# Patient Record
Sex: FEMALE | Race: WHITE | Employment: FULL TIME | URBAN - METROPOLITAN AREA
[De-identification: names, ages, dates, MRNs, and addresses within clinical notes are randomized per-mention and may not be internally consistent; named-entity substitution may affect disease eponyms.]

---

## 2017-06-12 PROBLEM — E55.9 VITAMIN D DEFICIENCY: Status: ACTIVE | Noted: 2017-06-12

## 2017-06-12 PROBLEM — E66.9 OBESITY (BMI 30-39.9): Status: ACTIVE | Noted: 2017-06-12

## 2017-06-12 PROBLEM — E78.5 HYPERLIPIDEMIA LDL GOAL <130: Status: ACTIVE | Noted: 2017-06-12

## 2017-06-26 ENCOUNTER — HOSPITAL ENCOUNTER (OUTPATIENT)
Dept: SLEEP MEDICINE | Age: 52
Discharge: HOME | End: 2017-06-26
Attending: FAMILY MEDICINE
Payer: COMMERCIAL

## 2017-06-26 PROCEDURE — 95811 POLYSOM 6/>YRS CPAP 4/> PARM: CPT

## 2017-07-11 PROBLEM — Z72.821 INADEQUATE SLEEP HYGIENE: Status: ACTIVE | Noted: 2017-07-11

## 2017-07-11 PROBLEM — G47.33 OSA (OBSTRUCTIVE SLEEP APNEA): Status: ACTIVE | Noted: 2017-07-11

## 2019-05-29 ENCOUNTER — HOSPITAL ENCOUNTER (OUTPATIENT)
Dept: ULTRASOUND IMAGING | Age: 54
Discharge: HOME | End: 2019-05-29
Attending: NURSE PRACTITIONER

## 2019-05-29 DIAGNOSIS — M79.89 SWELLING OF LEFT LOWER EXTREMITY: ICD-10-CM

## 2019-06-04 PROBLEM — R01.1 HEART MURMUR: Status: ACTIVE | Noted: 2019-06-04

## 2019-06-25 ENCOUNTER — HOSPITAL ENCOUNTER (OUTPATIENT)
Dept: NON INVASIVE DIAGNOSTICS | Age: 54
Discharge: HOME | End: 2019-06-25
Attending: NURSE PRACTITIONER

## 2019-06-25 DIAGNOSIS — R01.1 SYSTOLIC MURMUR: ICD-10-CM

## 2020-05-19 ENCOUNTER — HOSPITAL ENCOUNTER (OUTPATIENT)
Dept: GENERAL RADIOLOGY | Age: 55
Discharge: HOME | End: 2020-05-19

## 2020-05-19 DIAGNOSIS — M25.562 CHRONIC PAIN OF LEFT KNEE: ICD-10-CM

## 2020-05-19 DIAGNOSIS — G89.29 CHRONIC PAIN OF LEFT KNEE: ICD-10-CM

## 2020-05-19 PROBLEM — K57.30 DIVERTICULOSIS OF COLON: Status: ACTIVE | Noted: 2019-08-07

## 2020-05-19 PROBLEM — K44.9 DIAPHRAGMATIC HERNIA: Status: ACTIVE | Noted: 2020-05-19

## 2020-05-19 PROBLEM — K64.4 EXTERNAL HEMORRHOIDS: Status: ACTIVE | Noted: 2020-05-19

## 2020-05-19 PROBLEM — D12.6 BENIGN NEOPLASM OF COLON: Status: ACTIVE | Noted: 2019-08-07

## 2021-10-22 ENCOUNTER — HOSPITAL ENCOUNTER (OUTPATIENT)
Dept: LAB | Age: 56
Discharge: HOME | End: 2021-10-22

## 2021-10-22 PROCEDURE — 88305 TISSUE EXAM BY PATHOLOGIST: CPT

## 2022-03-18 PROBLEM — E55.9 VITAMIN D DEFICIENCY: Status: ACTIVE | Noted: 2017-06-12

## 2022-03-18 PROBLEM — K64.4 EXTERNAL HEMORRHOIDS: Status: ACTIVE | Noted: 2020-05-19

## 2022-03-18 PROBLEM — K44.9 DIAPHRAGMATIC HERNIA: Status: ACTIVE | Noted: 2020-05-19

## 2022-03-19 PROBLEM — G47.33 OSA (OBSTRUCTIVE SLEEP APNEA): Status: ACTIVE | Noted: 2017-07-11

## 2022-03-19 PROBLEM — K57.30 DIVERTICULOSIS OF COLON: Status: ACTIVE | Noted: 2019-08-07

## 2022-03-19 PROBLEM — Z72.821 INADEQUATE SLEEP HYGIENE: Status: ACTIVE | Noted: 2017-07-11

## 2022-03-19 PROBLEM — D12.6 BENIGN NEOPLASM OF COLON: Status: ACTIVE | Noted: 2019-08-07

## 2022-03-19 PROBLEM — E66.9 OBESITY (BMI 30-39.9): Status: ACTIVE | Noted: 2017-06-12

## 2022-03-19 PROBLEM — R01.1 HEART MURMUR: Status: ACTIVE | Noted: 2019-06-04

## 2022-03-20 PROBLEM — E78.5 HYPERLIPIDEMIA LDL GOAL <130: Status: ACTIVE | Noted: 2017-06-12

## 2022-11-15 ENCOUNTER — OFFICE VISIT (OUTPATIENT)
Dept: FAMILY MEDICINE CLINIC | Facility: CLINIC | Age: 57
End: 2022-11-15
Payer: COMMERCIAL

## 2022-11-15 VITALS
WEIGHT: 215 LBS | SYSTOLIC BLOOD PRESSURE: 125 MMHG | HEART RATE: 85 BPM | DIASTOLIC BLOOD PRESSURE: 75 MMHG | BODY MASS INDEX: 36.7 KG/M2 | TEMPERATURE: 98.5 F | HEIGHT: 64 IN

## 2022-11-15 DIAGNOSIS — I10 ESSENTIAL (PRIMARY) HYPERTENSION: ICD-10-CM

## 2022-11-15 DIAGNOSIS — U07.1 COVID-19: ICD-10-CM

## 2022-11-15 DIAGNOSIS — M70.52 OTHER BURSITIS OF KNEE, LEFT KNEE: ICD-10-CM

## 2022-11-15 DIAGNOSIS — R73.03 PREDIABETES: ICD-10-CM

## 2022-11-15 DIAGNOSIS — E78.49 OTHER HYPERLIPIDEMIA: Primary | ICD-10-CM

## 2022-11-15 DIAGNOSIS — K21.00 GASTRO-ESOPHAGEAL REFLUX DISEASE WITH ESOPHAGITIS, WITHOUT BLEEDING: ICD-10-CM

## 2022-11-15 PROCEDURE — 3074F SYST BP LT 130 MM HG: CPT | Performed by: NURSE PRACTITIONER

## 2022-11-15 PROCEDURE — 3078F DIAST BP <80 MM HG: CPT | Performed by: NURSE PRACTITIONER

## 2022-11-15 PROCEDURE — 99214 OFFICE O/P EST MOD 30 MIN: CPT | Performed by: NURSE PRACTITIONER

## 2022-11-15 RX ORDER — AMLODIPINE BESYLATE 5 MG/1
5 TABLET ORAL DAILY
Qty: 90 TABLET | Refills: 1 | Status: SHIPPED | OUTPATIENT
Start: 2022-11-15

## 2022-11-15 RX ORDER — SPIRONOLACTONE 25 MG/1
25 TABLET ORAL DAILY
Qty: 90 TABLET | Refills: 1 | Status: SHIPPED | OUTPATIENT
Start: 2022-11-15

## 2022-11-15 RX ORDER — OMEPRAZOLE 40 MG/1
40 CAPSULE, DELAYED RELEASE ORAL DAILY
Qty: 90 CAPSULE | Refills: 1 | Status: SHIPPED | OUTPATIENT
Start: 2022-11-15

## 2022-11-15 RX ORDER — LOSARTAN POTASSIUM 50 MG/1
50 TABLET ORAL DAILY
Qty: 90 TABLET | Refills: 1 | Status: SHIPPED | OUTPATIENT
Start: 2022-11-15

## 2022-11-15 RX ORDER — PRAVASTATIN SODIUM 40 MG
40 TABLET ORAL NIGHTLY
Qty: 90 TABLET | Refills: 1 | Status: SHIPPED | OUTPATIENT
Start: 2022-11-15

## 2022-11-15 SDOH — ECONOMIC STABILITY: FOOD INSECURITY: WITHIN THE PAST 12 MONTHS, YOU WORRIED THAT YOUR FOOD WOULD RUN OUT BEFORE YOU GOT MONEY TO BUY MORE.: NEVER TRUE

## 2022-11-15 SDOH — ECONOMIC STABILITY: FOOD INSECURITY: WITHIN THE PAST 12 MONTHS, THE FOOD YOU BOUGHT JUST DIDN'T LAST AND YOU DIDN'T HAVE MONEY TO GET MORE.: NEVER TRUE

## 2022-11-15 ASSESSMENT — PATIENT HEALTH QUESTIONNAIRE - PHQ9
SUM OF ALL RESPONSES TO PHQ QUESTIONS 1-9: 0
SUM OF ALL RESPONSES TO PHQ QUESTIONS 1-9: 0
SUM OF ALL RESPONSES TO PHQ9 QUESTIONS 1 & 2: 0
SUM OF ALL RESPONSES TO PHQ QUESTIONS 1-9: 0
SUM OF ALL RESPONSES TO PHQ QUESTIONS 1-9: 0
1. LITTLE INTEREST OR PLEASURE IN DOING THINGS: 0
2. FEELING DOWN, DEPRESSED OR HOPELESS: 0

## 2022-11-15 ASSESSMENT — ENCOUNTER SYMPTOMS
SHORTNESS OF BREATH: 0
COUGH: 0

## 2022-11-15 ASSESSMENT — SOCIAL DETERMINANTS OF HEALTH (SDOH): HOW HARD IS IT FOR YOU TO PAY FOR THE VERY BASICS LIKE FOOD, HOUSING, MEDICAL CARE, AND HEATING?: NOT HARD AT ALL

## 2022-11-15 NOTE — PROGRESS NOTES
Subjective:      Patient ID: Oli Syed is a 61 y.o. female. Here for refills of med for   Cholesterol taking med daily  Hypertension taking med daily no chest pain no shortness of breath  Reflux controlled with current med   Knee pain improved with topical med has had some right hip pain since covid thinks is due to her inactivity during her illness    HPI    Review of Systems   Constitutional:  Negative for diaphoresis and fatigue. HENT:  Negative for congestion. Respiratory:  Negative for cough and shortness of breath. Cardiovascular:  Negative for chest pain. Musculoskeletal:  Negative for arthralgias. Hematological:  Negative for adenopathy. Objective:   Physical Exam  Vitals and nursing note reviewed. Constitutional:       Appearance: Normal appearance. She is normal weight. Cardiovascular:      Rate and Rhythm: Normal rate and regular rhythm. Pulses: Normal pulses. Heart sounds: Normal heart sounds. Pulmonary:      Effort: Pulmonary effort is normal.      Breath sounds: Normal breath sounds. Musculoskeletal:      Comments: Ome decreased internal and external rotation of right hip   Skin:     General: Skin is warm and dry. Capillary Refill: Capillary refill takes less than 2 seconds. Neurological:      Mental Status: She is alert. Psychiatric:         Mood and Affect: Mood normal.         Behavior: Behavior normal.         Thought Content: Thought content normal.         Judgment: Judgment normal.       Assessment:      /75 (Site: Left Upper Arm, Position: Sitting, Cuff Size: Large Adult)   Pulse 85   Temp 98.5 °F (36.9 °C) (Temporal)   Ht 5' 4.25\" (1.632 m)   Wt 215 lb (97.5 kg)   BMI 36.62 kg/m²         Plan:      1. Other hyperlipidemia  -     pravastatin (PRAVACHOL) 40 MG tablet; Take 1 tablet by mouth at bedtime, Disp-90 tablet, R-1Normal  -     Comprehensive Metabolic Panel; Future  -     Lipid Panel; Future  2.  Essential (primary) hypertension  -     losartan (COZAAR) 50 MG tablet; Take 1 tablet by mouth daily, Disp-90 tablet, R-1Normal  -     spironolactone (ALDACTONE) 25 MG tablet; Take 1 tablet by mouth daily, Disp-90 tablet, R-1Normal  -     amLODIPine (NORVASC) 5 MG tablet; Take 1 tablet by mouth daily, Disp-90 tablet, R-1Normal  3. Other bursitis of knee, left knee  -     diclofenac sodium (VOLTAREN) 1 % GEL; Apply topically 4 times daily, Topical, 4 TIMES DAILY Starting Tue 11/15/2022, Disp-100 g, R-3, Normal  4. Gastro-esophageal reflux disease with esophagitis, without bleeding  -     omeprazole (PRILOSEC) 40 MG delayed release capsule; Take 1 capsule by mouth daily, Disp-90 capsule, R-1Normal  5. COVID-19  6. Prediabetes  -     Hemoglobin A1C; Future       Refilled meds checking labs Urged smoking cessation. Is to return if hip does not respond to stretching.  May need xray may be Suman 25, APRN - NP

## 2022-11-18 DIAGNOSIS — E78.49 OTHER HYPERLIPIDEMIA: ICD-10-CM

## 2022-11-18 DIAGNOSIS — R73.03 PREDIABETES: ICD-10-CM

## 2022-11-18 LAB
ALBUMIN SERPL-MCNC: 4.1 G/DL (ref 3.5–5)
ALBUMIN/GLOB SERPL: 1.4 {RATIO} (ref 0.4–1.6)
ALP SERPL-CCNC: 76 U/L (ref 50–136)
ALT SERPL-CCNC: 42 U/L (ref 12–65)
ANION GAP SERPL CALC-SCNC: 3 MMOL/L (ref 2–11)
AST SERPL-CCNC: 20 U/L (ref 15–37)
BILIRUB SERPL-MCNC: 1 MG/DL (ref 0.2–1.1)
BUN SERPL-MCNC: 14 MG/DL (ref 6–23)
CALCIUM SERPL-MCNC: 9.5 MG/DL (ref 8.3–10.4)
CHLORIDE SERPL-SCNC: 107 MMOL/L (ref 101–110)
CHOLEST SERPL-MCNC: 184 MG/DL
CO2 SERPL-SCNC: 28 MMOL/L (ref 21–32)
CREAT SERPL-MCNC: 0.8 MG/DL (ref 0.6–1)
EST. AVERAGE GLUCOSE BLD GHB EST-MCNC: 126 MG/DL
GLOBULIN SER CALC-MCNC: 3 G/DL (ref 2.8–4.5)
GLUCOSE SERPL-MCNC: 124 MG/DL (ref 65–100)
HBA1C MFR BLD: 6 % (ref 4.8–5.6)
HDLC SERPL-MCNC: 52 MG/DL (ref 40–60)
HDLC SERPL: 3.5 {RATIO}
LDLC SERPL CALC-MCNC: 98.4 MG/DL
POTASSIUM SERPL-SCNC: 4 MMOL/L (ref 3.5–5.1)
PROT SERPL-MCNC: 7.1 G/DL (ref 6.3–8.2)
SODIUM SERPL-SCNC: 138 MMOL/L (ref 133–143)
TRIGL SERPL-MCNC: 168 MG/DL (ref 35–150)
VLDLC SERPL CALC-MCNC: 33.6 MG/DL (ref 6–23)

## 2022-11-23 ENCOUNTER — TELEPHONE (OUTPATIENT)
Dept: FAMILY MEDICINE CLINIC | Facility: CLINIC | Age: 57
End: 2022-11-23

## 2022-11-23 NOTE — TELEPHONE ENCOUNTER
----- [facility] from [facility] sent at 11/21/2022  7:46 AM EST -----  Labs are all good HgA1c stable at 6.0

## 2023-02-14 ENCOUNTER — OFFICE VISIT (OUTPATIENT)
Dept: FAMILY MEDICINE CLINIC | Facility: CLINIC | Age: 58
End: 2023-02-14
Payer: COMMERCIAL

## 2023-02-14 VITALS
SYSTOLIC BLOOD PRESSURE: 135 MMHG | DIASTOLIC BLOOD PRESSURE: 82 MMHG | TEMPERATURE: 99 F | BODY MASS INDEX: 37.9 KG/M2 | WEIGHT: 222 LBS | HEART RATE: 102 BPM | HEIGHT: 64 IN

## 2023-02-14 DIAGNOSIS — M67.441 GANGLION CYST OF FLEXOR TENDON SHEATH OF FINGER OF RIGHT HAND: ICD-10-CM

## 2023-02-14 PROCEDURE — 3075F SYST BP GE 130 - 139MM HG: CPT | Performed by: FAMILY MEDICINE

## 2023-02-14 PROCEDURE — 3079F DIAST BP 80-89 MM HG: CPT | Performed by: FAMILY MEDICINE

## 2023-02-14 PROCEDURE — 99213 OFFICE O/P EST LOW 20 MIN: CPT | Performed by: FAMILY MEDICINE

## 2023-02-14 SDOH — ECONOMIC STABILITY: FOOD INSECURITY: WITHIN THE PAST 12 MONTHS, THE FOOD YOU BOUGHT JUST DIDN'T LAST AND YOU DIDN'T HAVE MONEY TO GET MORE.: NEVER TRUE

## 2023-02-14 SDOH — ECONOMIC STABILITY: FOOD INSECURITY: WITHIN THE PAST 12 MONTHS, YOU WORRIED THAT YOUR FOOD WOULD RUN OUT BEFORE YOU GOT MONEY TO BUY MORE.: NEVER TRUE

## 2023-02-14 SDOH — ECONOMIC STABILITY: INCOME INSECURITY: HOW HARD IS IT FOR YOU TO PAY FOR THE VERY BASICS LIKE FOOD, HOUSING, MEDICAL CARE, AND HEATING?: NOT HARD AT ALL

## 2023-02-14 SDOH — ECONOMIC STABILITY: HOUSING INSECURITY
IN THE LAST 12 MONTHS, WAS THERE A TIME WHEN YOU DID NOT HAVE A STEADY PLACE TO SLEEP OR SLEPT IN A SHELTER (INCLUDING NOW)?: NO

## 2023-02-14 NOTE — ASSESSMENT & PLAN NOTE
Problem and/or Symptoms are new to provider. Will have patient follow up as directed and make the following plan for further evaluation and/or treatment:    Appears to be a simple cyst that is not currently impairing her ROM or use of the 5th digit; has actually improved from initial presentation 2 wks ago. Advised pt to begin applying topical NSAID gel 3-4 X a day to the affected area as this may help it to resolve on its own; if it gets worse over the next few weeks will consider referral to hand specialist for definitive Tx via excision.

## 2023-02-14 NOTE — PROGRESS NOTES
Ina  [address] [place] [address]  Phone: [phone]  Fax: [phone]  Email: [email or web address]      Encounter [address] Gross; Established patient 60 y. o.female; seen 2/14/2023 for: Cyst (Right pinky-effecting motor skills)      Assessment & Plan    1. Ganglion cyst of flexor tendon sheath of finger of right hand  Assessment & Plan:  Problem and/or Symptoms are new to provider. Will have patient follow up as directed and make the following plan for further evaluation and/or treatment:    Appears to be a simple cyst that is not currently impairing her ROM or use of the 5th digit; has actually improved from initial presentation 2 wks ago. Advised pt to begin applying topical NSAID gel 3-4 X a day to the affected area as this may help it to resolve on its own; if it gets worse over the next few weeks will consider referral to hand specialist for definitive Tx via excision. Check Out Instructions  Return if symptoms worsen or fail to improve. Subjective & Objective    HPI  Pt states that 2 weeks ago she developed a SubQ cyst on the base of her R 5th digit near the MTP joint. Was initially quite swollen & red and was TTP; it has improved over the past week where the swelling & tenderness has gone down but the cyst still seems to be present. Has not tried anything OTC for this issue. Review of Systems    Physical Exam  Skin:     Comments: Small approx 0.5 cm SubQ cyst/nodule at the base of her 5th digit on the R hand; palmar side. Non-TTP & no surrounding erythema or current swelling     Vitals:    02/14/23 1407   BP: 135/82   Site: Left Upper Arm   Position: Sitting   Cuff Size: Large Adult   Pulse: (!) 102   Temp: 99 °F (37.2 °C)   Weight: 222 lb (100.7 kg)   Height: 5' 4\" (1.626 m)     BP Readings from Last 3 Encounters:   02/14/23 135/82   11/15/22 125/75   05/18/22 132/82     Body mass index is 38.11 kg/m².     Wt Readings from Last 3 Encounters: 02/14/23 222 lb (100.7 kg)   11/15/22 215 lb (97.5 kg)   05/18/22 209 lb (94.8 kg)       PHQ-9  11/15/2022   Little interest or pleasure in doing things 0   Little interest or pleasure in doing things -   Feeling down, depressed, or hopeless 0   PHQ-2 Score 0   Total Score PHQ 2 -   PHQ-9 Total Score 0        We discussed the typical prognosis and potential complications of the concern(s), including treatment options. Medication risks, benefits, costs, interactions, and alternatives were discussed as appropriate. I advised her to contact the office if her condition worsens or fails to improve as anticipated. She expressed understanding with the discussion and plan of care. An electronic signature was used to authenticate this note.   -- Diana Cowan MD

## 2023-02-14 NOTE — PATIENT INSTRUCTIONS
Please know that we keep Urgent Care visit slots in reserve every day for any acute illness or injury. If you ever have an urgent issue please call our office and we should typically be able to see you within 24 hours, but most often you will be able to be seen the same day that you call. We also offer Virtual Visits that can be done over a video connection, or regular phone call if you don't have a video connection, if that is your preference vs an office visit. Finally, please make sure you are scheduling your visits with someone who works at our [facility], and not scheduling with our \"call center\". When you get the phone tree options, press \"Option 2\" first & then \"Option 1\". This combination should take you directly to someone at our office. Please try to avoid scheduling any visits through our call center if at all possible.

## 2023-05-15 ENCOUNTER — OFFICE VISIT (OUTPATIENT)
Dept: FAMILY MEDICINE CLINIC | Facility: CLINIC | Age: 58
End: 2023-05-15
Payer: COMMERCIAL

## 2023-05-15 VITALS
DIASTOLIC BLOOD PRESSURE: 72 MMHG | TEMPERATURE: 98.2 F | BODY MASS INDEX: 37.73 KG/M2 | WEIGHT: 221 LBS | HEIGHT: 64 IN | SYSTOLIC BLOOD PRESSURE: 125 MMHG | HEART RATE: 92 BPM

## 2023-05-15 DIAGNOSIS — M70.52 OTHER BURSITIS OF KNEE, LEFT KNEE: ICD-10-CM

## 2023-05-15 DIAGNOSIS — E78.49 OTHER HYPERLIPIDEMIA: ICD-10-CM

## 2023-05-15 DIAGNOSIS — E66.01 SEVERE OBESITY (BMI 35.0-39.9) WITH COMORBIDITY (HCC): ICD-10-CM

## 2023-05-15 DIAGNOSIS — R53.82 CHRONIC FATIGUE: ICD-10-CM

## 2023-05-15 DIAGNOSIS — G47.30 SLEEP APNEA, UNSPECIFIED TYPE: ICD-10-CM

## 2023-05-15 DIAGNOSIS — K21.00 GASTRO-ESOPHAGEAL REFLUX DISEASE WITH ESOPHAGITIS, WITHOUT BLEEDING: ICD-10-CM

## 2023-05-15 DIAGNOSIS — I10 ESSENTIAL (PRIMARY) HYPERTENSION: ICD-10-CM

## 2023-05-15 DIAGNOSIS — I10 ESSENTIAL (PRIMARY) HYPERTENSION: Primary | ICD-10-CM

## 2023-05-15 PROCEDURE — 99214 OFFICE O/P EST MOD 30 MIN: CPT | Performed by: NURSE PRACTITIONER

## 2023-05-15 PROCEDURE — 3074F SYST BP LT 130 MM HG: CPT | Performed by: NURSE PRACTITIONER

## 2023-05-15 PROCEDURE — 3078F DIAST BP <80 MM HG: CPT | Performed by: NURSE PRACTITIONER

## 2023-05-15 RX ORDER — OMEPRAZOLE 40 MG/1
40 CAPSULE, DELAYED RELEASE ORAL DAILY
Qty: 90 CAPSULE | Refills: 1 | Status: SHIPPED | OUTPATIENT
Start: 2023-05-15

## 2023-05-15 RX ORDER — LOSARTAN POTASSIUM 50 MG/1
50 TABLET ORAL DAILY
Qty: 90 TABLET | Refills: 1 | Status: SHIPPED | OUTPATIENT
Start: 2023-05-15

## 2023-05-15 RX ORDER — SPIRONOLACTONE 25 MG/1
25 TABLET ORAL DAILY
Qty: 90 TABLET | Refills: 1 | Status: SHIPPED | OUTPATIENT
Start: 2023-05-15

## 2023-05-15 RX ORDER — PRAVASTATIN SODIUM 40 MG
40 TABLET ORAL NIGHTLY
Qty: 90 TABLET | Refills: 1 | Status: SHIPPED | OUTPATIENT
Start: 2023-05-15

## 2023-05-15 RX ORDER — AMLODIPINE BESYLATE 5 MG/1
5 TABLET ORAL DAILY
Qty: 90 TABLET | Refills: 1 | Status: SHIPPED | OUTPATIENT
Start: 2023-05-15

## 2023-05-15 ASSESSMENT — ENCOUNTER SYMPTOMS: SHORTNESS OF BREATH: 0

## 2023-05-15 ASSESSMENT — PATIENT HEALTH QUESTIONNAIRE - PHQ9
SUM OF ALL RESPONSES TO PHQ QUESTIONS 1-9: 0
2. FEELING DOWN, DEPRESSED OR HOPELESS: 0
SUM OF ALL RESPONSES TO PHQ QUESTIONS 1-9: 0
1. LITTLE INTEREST OR PLEASURE IN DOING THINGS: 0
SUM OF ALL RESPONSES TO PHQ QUESTIONS 1-9: 0
SUM OF ALL RESPONSES TO PHQ QUESTIONS 1-9: 0
SUM OF ALL RESPONSES TO PHQ9 QUESTIONS 1 & 2: 0

## 2023-05-15 NOTE — PROGRESS NOTES
Subjective:      Patient ID: Ronny Washington is a 60 y.o. female. She is here for refills of med's for   HTN No chest no shortness of breath activity tolerance is ok Is tired found out sister on thyroid med  Cholesterol taking med daily  Knee pain controlled with current med  Is looking forward to a trip to the [facility] in July. Obesity trying to lose weight snacking now on veggies but weight no leaving is inactive  HPI    Review of Systems   Constitutional:  Positive for fatigue. Negative for unexpected weight change. Respiratory:  Negative for shortness of breath. Cardiovascular:  Negative for chest pain. Musculoskeletal:         Knee pain improved with voltaren gel     Objective:   Physical Exam  Vitals and nursing note reviewed. Constitutional:       Appearance: Normal appearance. HENT:      Head: Normocephalic. Cardiovascular:      Rate and Rhythm: Normal rate and regular rhythm. Heart sounds: Murmur (2/6 jeremy rsb 2ics) heard. Pulmonary:      Effort: Pulmonary effort is normal.      Breath sounds: Normal breath sounds. Abdominal:      General: Bowel sounds are normal.   Musculoskeletal:         General: Normal range of motion. Cervical back: Normal range of motion and neck supple. Lymphadenopathy:      Cervical: No cervical adenopathy. Skin:     General: Skin is warm and dry. Neurological:      General: No focal deficit present. Mental Status: She is alert and oriented to person, place, and time. Psychiatric:         Mood and Affect: Mood normal.         Behavior: Behavior normal.       Assessment:      /72 (Site: Left Upper Arm, Position: Sitting, Cuff Size: Large Adult)   Pulse 92   Temp 98.2 °F (36.8 °C) (Temporal)   Ht 5' 4\" (1.626 m)   Wt 221 lb (100.2 kg)   BMI 37.93 kg/m²        Plan:      1. Essential (primary) hypertension  -     amLODIPine (NORVASC) 5 MG tablet;  Take 1 tablet by mouth daily, Disp-90 tablet, R-1Normal  -     losartan (COZAAR) 50 MG

## 2023-05-16 ENCOUNTER — TELEPHONE (OUTPATIENT)
Dept: FAMILY MEDICINE CLINIC | Facility: CLINIC | Age: 58
End: 2023-05-16

## 2023-05-16 LAB
ALBUMIN SERPL-MCNC: 4 G/DL (ref 3.5–5)
ALBUMIN/GLOB SERPL: 1.2 (ref 0.4–1.6)
ALP SERPL-CCNC: 62 U/L (ref 50–136)
ALT SERPL-CCNC: 46 U/L (ref 12–65)
ANION GAP SERPL CALC-SCNC: 4 MMOL/L (ref 2–11)
AST SERPL-CCNC: 22 U/L (ref 15–37)
BILIRUB SERPL-MCNC: 0.7 MG/DL (ref 0.2–1.1)
BUN SERPL-MCNC: 10 MG/DL (ref 6–23)
CALCIUM SERPL-MCNC: 9.4 MG/DL (ref 8.3–10.4)
CHLORIDE SERPL-SCNC: 106 MMOL/L (ref 101–110)
CHOLEST SERPL-MCNC: 163 MG/DL
CO2 SERPL-SCNC: 27 MMOL/L (ref 21–32)
CREAT SERPL-MCNC: 0.8 MG/DL (ref 0.6–1)
GLOBULIN SER CALC-MCNC: 3.4 G/DL (ref 2.8–4.5)
GLUCOSE SERPL-MCNC: 129 MG/DL (ref 65–100)
HDLC SERPL-MCNC: 63 MG/DL (ref 40–60)
HDLC SERPL: 2.6
LDLC SERPL CALC-MCNC: 73.4 MG/DL
POTASSIUM SERPL-SCNC: 4.1 MMOL/L (ref 3.5–5.1)
PROT SERPL-MCNC: 7.4 G/DL (ref 6.3–8.2)
SODIUM SERPL-SCNC: 137 MMOL/L (ref 133–143)
TRIGL SERPL-MCNC: 133 MG/DL (ref 35–150)
TSH, 3RD GENERATION: 3.53 UIU/ML (ref 0.36–3.74)
VLDLC SERPL CALC-MCNC: 26.6 MG/DL (ref 6–23)

## 2023-05-16 NOTE — TELEPHONE ENCOUNTER
----- Message from AWA Epstein NP sent at 5/16/2023  7:36 AM EDT -----  Labs are good except BS has wandered up   Buckle down on diet and exercise and at next visit will check a HgA1c for dm , or you can return now to have done

## 2023-06-27 ENCOUNTER — OFFICE VISIT (OUTPATIENT)
Dept: SLEEP MEDICINE | Age: 58
End: 2023-06-27
Payer: COMMERCIAL

## 2023-06-27 VITALS
WEIGHT: 222 LBS | TEMPERATURE: 97.3 F | OXYGEN SATURATION: 96 % | SYSTOLIC BLOOD PRESSURE: 136 MMHG | HEIGHT: 64 IN | BODY MASS INDEX: 37.9 KG/M2 | HEART RATE: 108 BPM | DIASTOLIC BLOOD PRESSURE: 82 MMHG | RESPIRATION RATE: 15 BRPM

## 2023-06-27 DIAGNOSIS — G47.33 OSA (OBSTRUCTIVE SLEEP APNEA): Primary | ICD-10-CM

## 2023-06-27 DIAGNOSIS — G47.00 PERSISTENT DISORDER OF INITIATING OR MAINTAINING SLEEP: ICD-10-CM

## 2023-06-27 DIAGNOSIS — R01.1 HEART MURMUR: ICD-10-CM

## 2023-06-27 PROCEDURE — 3075F SYST BP GE 130 - 139MM HG: CPT | Performed by: NURSE PRACTITIONER

## 2023-06-27 PROCEDURE — 99203 OFFICE O/P NEW LOW 30 MIN: CPT | Performed by: NURSE PRACTITIONER

## 2023-06-27 PROCEDURE — 3079F DIAST BP 80-89 MM HG: CPT | Performed by: NURSE PRACTITIONER

## 2023-06-27 ASSESSMENT — SLEEP AND FATIGUE QUESTIONNAIRES
HOW LIKELY ARE YOU TO NOD OFF OR FALL ASLEEP IN A CAR, WHILE STOPPED FOR A FEW MINUTES IN TRAFFIC: 0
HOW LIKELY ARE YOU TO NOD OFF OR FALL ASLEEP WHILE SITTING QUIETLY AFTER LUNCH WITHOUT ALCOHOL: 0
HOW LIKELY ARE YOU TO NOD OFF OR FALL ASLEEP WHILE SITTING INACTIVE IN A PUBLIC PLACE: 0
ESS TOTAL SCORE: 0
HOW LIKELY ARE YOU TO NOD OFF OR FALL ASLEEP WHILE SITTING AND READING: 0
HOW LIKELY ARE YOU TO NOD OFF OR FALL ASLEEP WHILE LYING DOWN TO REST IN THE AFTERNOON WHEN CIRCUMSTANCES PERMIT: 0
HOW LIKELY ARE YOU TO NOD OFF OR FALL ASLEEP WHEN YOU ARE A PASSENGER IN A CAR FOR AN HOUR WITHOUT A BREAK: 0
HOW LIKELY ARE YOU TO NOD OFF OR FALL ASLEEP WHILE SITTING AND TALKING TO SOMEONE: 0
HOW LIKELY ARE YOU TO NOD OFF OR FALL ASLEEP WHILE WATCHING TV: 0

## 2023-07-19 ENCOUNTER — HOSPITAL ENCOUNTER (OUTPATIENT)
Dept: NON INVASIVE DIAGNOSTICS | Age: 58
Discharge: HOME | End: 2023-07-21
Payer: COMMERCIAL

## 2023-07-19 VITALS
DIASTOLIC BLOOD PRESSURE: 85 MMHG | HEIGHT: 64 IN | WEIGHT: 222 LBS | BODY MASS INDEX: 37.9 KG/M2 | SYSTOLIC BLOOD PRESSURE: 161 MMHG

## 2023-07-19 DIAGNOSIS — R01.1 HEART MURMUR: ICD-10-CM

## 2023-07-19 LAB
ECHO AO ASC DIAM: 3.1 CM
ECHO AO ASCENDING AORTA INDEX: 1.52 CM/M2
ECHO AO ROOT DIAM: 2.9 CM
ECHO AO ROOT INDEX: 1.42 CM/M2
ECHO AV AREA PEAK VELOCITY: 1.8 CM2
ECHO AV AREA VTI: 1.9 CM2
ECHO AV AREA/BSA PEAK VELOCITY: 0.9 CM2/M2
ECHO AV AREA/BSA VTI: 0.9 CM2/M2
ECHO AV MEAN GRADIENT: 7 MMHG
ECHO AV MEAN VELOCITY: 1.2 M/S
ECHO AV PEAK GRADIENT: 15 MMHG
ECHO AV PEAK VELOCITY: 1.9 M/S
ECHO AV VELOCITY RATIO: 0.68
ECHO AV VTI: 35.3 CM
ECHO BSA: 2.13 M2
ECHO EST RA PRESSURE: 8 MMHG
ECHO IVC PROX: 2.2 CM
ECHO LA AREA 2C: 21.5 CM2
ECHO LA AREA 4C: 24.2 CM2
ECHO LA DIAMETER INDEX: 2.11 CM/M2
ECHO LA DIAMETER: 4.3 CM
ECHO LA MAJOR AXIS: 5.9 CM
ECHO LA MINOR AXIS: 6.2 CM
ECHO LA TO AORTIC ROOT RATIO: 1.48
ECHO LA VOL 2C: 60 ML (ref 22–52)
ECHO LA VOL 4C: 80 ML (ref 22–52)
ECHO LA VOL BP: 71 ML (ref 22–52)
ECHO LA VOL/BSA BIPLANE: 35 ML/M2 (ref 16–34)
ECHO LA VOLUME INDEX A2C: 29 ML/M2 (ref 16–34)
ECHO LA VOLUME INDEX A4C: 39 ML/M2 (ref 16–34)
ECHO LV E' LATERAL VELOCITY: 12 CM/S
ECHO LV E' SEPTAL VELOCITY: 13 CM/S
ECHO LV EDV A2C: 83 ML
ECHO LV EDV A4C: 92 ML
ECHO LV EDV INDEX A4C: 45 ML/M2
ECHO LV EDV NDEX A2C: 41 ML/M2
ECHO LV EJECTION FRACTION A2C: 66 %
ECHO LV EJECTION FRACTION A4C: 67 %
ECHO LV EJECTION FRACTION BIPLANE: 66 % (ref 55–100)
ECHO LV ESV A2C: 28 ML
ECHO LV ESV A4C: 31 ML
ECHO LV ESV INDEX A2C: 14 ML/M2
ECHO LV ESV INDEX A4C: 15 ML/M2
ECHO LV FRACTIONAL SHORTENING: 37 % (ref 28–44)
ECHO LV INTERNAL DIMENSION DIASTOLE INDEX: 2.5 CM/M2
ECHO LV INTERNAL DIMENSION DIASTOLIC: 5.1 CM (ref 3.9–5.3)
ECHO LV INTERNAL DIMENSION SYSTOLIC INDEX: 1.57 CM/M2
ECHO LV INTERNAL DIMENSION SYSTOLIC: 3.2 CM
ECHO LV IVSD: 1 CM (ref 0.6–0.9)
ECHO LV MASS 2D: 188 G (ref 67–162)
ECHO LV MASS INDEX 2D: 92.2 G/M2 (ref 43–95)
ECHO LV POSTERIOR WALL DIASTOLIC: 1 CM (ref 0.6–0.9)
ECHO LV RELATIVE WALL THICKNESS RATIO: 0.39
ECHO LVOT AREA: 2.5 CM2
ECHO LVOT AV VTI INDEX: 0.73
ECHO LVOT DIAM: 1.8 CM
ECHO LVOT MEAN GRADIENT: 4 MMHG
ECHO LVOT PEAK GRADIENT: 7 MMHG
ECHO LVOT PEAK VELOCITY: 1.3 M/S
ECHO LVOT STROKE VOLUME INDEX: 32 ML/M2
ECHO LVOT SV: 65.4 ML
ECHO LVOT VTI: 25.7 CM
ECHO MV A VELOCITY: 0.91 M/S
ECHO MV AREA VTI: 2.6 CM2
ECHO MV E DECELERATION TIME (DT): 212 MS
ECHO MV E VELOCITY: 1.04 M/S
ECHO MV E/A RATIO: 1.14
ECHO MV E/E' LATERAL: 8.67
ECHO MV E/E' RATIO (AVERAGED): 8.33
ECHO MV E/E' SEPTAL: 8
ECHO MV LVOT VTI INDEX: 0.97
ECHO MV MAX VELOCITY: 1.1 M/S
ECHO MV MEAN GRADIENT: 3 MMHG
ECHO MV MEAN VELOCITY: 0.8 M/S
ECHO MV PEAK GRADIENT: 5 MMHG
ECHO MV VTI: 24.9 CM
ECHO PV ACCELERATION TIME (AT): 61 MS
ECHO PV MAX VELOCITY: 1.2 M/S
ECHO PV PEAK GRADIENT: 6 MMHG
ECHO RV BASAL DIMENSION: 2.8 CM
ECHO RV FREE WALL PEAK S': 16 CM/S
ECHO RV INTERNAL DIMENSION: 2.8 CM
ECHO RV TAPSE: 2.8 CM (ref 1.7–?)

## 2023-07-19 PROCEDURE — 93306 TTE W/DOPPLER COMPLETE: CPT

## 2023-07-20 ENCOUNTER — TELEPHONE (OUTPATIENT)
Dept: SLEEP MEDICINE | Age: 58
End: 2023-07-20

## 2023-07-20 DIAGNOSIS — R01.1 HEART MURMUR: Primary | ICD-10-CM

## 2023-07-20 NOTE — TELEPHONE ENCOUNTER
I spoke with patient regarding recent echocardiogram. Echo was normal except for the left dilated atrium which is new since 2019 echo. She denies chest pain, dizziness, heart palpitations, etc.     Will refer to cardiology for evaluation for left atrium and heart murmur.      Orders Placed This Encounter   Procedures    Nigel Álvarez     Referral Priority:   Routine     Referral Type:   Eval and Treat     Referral Reason:   Specialty Services Required     Requested Specialty:   Cardiology     Number of Visits Requested:   [facility], APRN - CNP

## 2023-07-21 ENCOUNTER — OFFICE VISIT (OUTPATIENT)
Dept: FAMILY MEDICINE CLINIC | Facility: CLINIC | Age: 58
End: 2023-07-21
Payer: COMMERCIAL

## 2023-07-21 VITALS
DIASTOLIC BLOOD PRESSURE: 81 MMHG | BODY MASS INDEX: 38.24 KG/M2 | WEIGHT: 224 LBS | SYSTOLIC BLOOD PRESSURE: 136 MMHG | TEMPERATURE: 98 F | HEART RATE: 94 BPM | HEIGHT: 64 IN

## 2023-07-21 DIAGNOSIS — M25.511 ACUTE PAIN OF RIGHT SHOULDER: Primary | ICD-10-CM

## 2023-07-21 PROCEDURE — 3075F SYST BP GE 130 - 139MM HG: CPT | Performed by: NURSE PRACTITIONER

## 2023-07-21 PROCEDURE — 99214 OFFICE O/P EST MOD 30 MIN: CPT | Performed by: NURSE PRACTITIONER

## 2023-07-21 PROCEDURE — 3079F DIAST BP 80-89 MM HG: CPT | Performed by: NURSE PRACTITIONER

## 2023-07-24 DIAGNOSIS — M25.511 ACUTE PAIN OF RIGHT SHOULDER: ICD-10-CM

## 2023-07-25 ENCOUNTER — TELEPHONE (OUTPATIENT)
Dept: FAMILY MEDICINE CLINIC | Facility: CLINIC | Age: 58
End: 2023-07-25

## 2023-07-25 NOTE — TELEPHONE ENCOUNTER
Pt said she has not heard from Ortho yet but starts PT next week. I gave her the phone number for Dali Barboza to reach out to them. I called the patient and spoke with her about her shoulder xray results.

## 2023-07-25 NOTE — TELEPHONE ENCOUNTER
----- Message from AWA Swann NP sent at 7/25/2023  7:52 AM EDT -----  Arthritic changes noted no fracture have your heard from the ortho referral?

## 2023-08-02 ENCOUNTER — HOSPITAL ENCOUNTER (OUTPATIENT)
Dept: PHYSICAL THERAPY | Age: 58
Setting detail: RECURRING SERIES
Discharge: HOME | End: 2023-08-05
Payer: COMMERCIAL

## 2023-08-02 PROCEDURE — 97110 THERAPEUTIC EXERCISES: CPT

## 2023-08-02 PROCEDURE — 97161 PT EVAL LOW COMPLEX 20 MIN: CPT

## 2023-08-02 ASSESSMENT — PAIN DESCRIPTION - PAIN TYPE: TYPE: ACUTE PAIN

## 2023-08-02 ASSESSMENT — PAIN DESCRIPTION - ORIENTATION: ORIENTATION: RIGHT

## 2023-08-02 ASSESSMENT — PAIN SCALES - GENERAL: PAINLEVEL_OUTOF10: 4

## 2023-08-02 ASSESSMENT — PAIN DESCRIPTION - DESCRIPTORS: DESCRIPTORS: SHARP

## 2023-08-02 ASSESSMENT — PAIN DESCRIPTION - LOCATION: LOCATION: SHOULDER

## 2023-08-02 NOTE — PROGRESS NOTES
Mick Cuellar  : 1965  Primary: Rene Contreras ([address])  Secondary:  [facility]  [address] Anuradha Hines [place] [address]  Phone: [phone]  Fax: [phone] Plan Frequency: 2 x week for 6 weeks  Plan of Care/Certification Expiration Date: 10/03/23      >PT Visit Info:  Plan Frequency: 2 x week for 6 weeks  Plan of Care/Certification Expiration Date: 10/03/23  Total # of Visits to Date: 1      Visit Count:  1    OUTPATIENT PHYSICAL THERAPY:OP NOTE TYPE: OP Note Type: Treatment Note 2023       Episode  }Appt Desk             Treatment Diagnosis:  Pain in Right Shoulder (M25.511)  Stiffness of Right Shoulder, Not elsewhere classified (M25.611)  Medical/Referring Diagnosis:  Acute pain of right shoulder [M25.511]  Referring Physician:  AWA Mckay NP, MD Orders:  PT Eval and Treat   Date of Onset:  Onset Date: 23     Allergies:   Penicillins       Interventions Planned (Treatment may consist of any combination of the following):    Current Treatment Recommendations: Strengthening; ROM; Manual; Home exercise program; Modalities     >Subjective Comments:  Pain has improved but still hurts with movement  >Initial: Right Shoulder 4/10>Post Session:  Right  Shoulder 3/10  Medications Last Reviewed:  2023  Updated Objective Findings:  See evaluation note from today  Treatment   Access Code: [email or web address]  URL: [email or web address]  Date: 2023  Prepared by:  José Burnette    Exercises  - Standing Shoulder Flexion AAROM with Dowel  - 2 x daily - 10 reps  - Standing Shoulder Abduction AAROM with Dowel  - 2 x daily - 10 reps  - Standing Shoulder External Rotation AAROM with Dowel  - 2 x daily - 10 reps  - Flexion-Extension Shoulder Pendulum with Table Support  - 2 x daily - 10 reps  - Horizontal Shoulder Pendulum with Table Support  - 2 x daily - 10 reps  THERAPEUTIC EXERCISE: (15 minutes):    Exercises per grid below to improve mobility, strength, and

## 2023-08-05 NOTE — PROGRESS NOTES
Cady Linares  : 1965  Primary: Krysten Contreras ([employer])  Secondary:  [facility]  [address] Jeferson Kapadia [address] [place] [address]  Phone: [phone]  Fax: [phone] Plan Frequency: 2 x week for 6 weeks    Plan of Care/Certification Expiration Date: 10/03/23      PT Visit Info:  Plan Frequency: 2 x week for 6 weeks  Plan of Care/Certification Expiration Date: 10/03/23  Total # of Visits to Date: 1      Visit Count:  1                OUTPATIENT PHYSICAL THERAPY:             OP NOTE TYPE: Initial Assessment 2023               Episode (right shoulder pain) Appt Desk         Treatment Diagnosis:  Pain in Right Shoulder (M25.511)  Stiffness of Right Shoulder, Not elsewhere classified (M25.611)  Medical/Referring Diagnosis:  Acute pain of right shoulder [M25.511]  Referring Physician:  AWA Brewer NP, MD Orders:  PT Eval and Treat   Return MD Appt:  11-10-23  Date of Onset:  Onset Date: 23      Allergies:  Penicillins    Medications Last Reviewed:  2023     SUBJECTIVE   History of Injury/Illness (Reason for Referral):  Cady Linares reports she was on a charter boat and slipped going down stairs. She grabbed a rail with her right arm and twisted around to her right. She denies a fall. She had immediate onset of pain in anterior and lateral right shoulder, but minimal swelling. Patient Stated Goal(s):  \"move my arm without pain\"  Initial: Right Shoulder 4/10 Post Session: Right Shoulder 3/10  Past Medical History/Comorbidities:   Ms. Keenan Snyder  has a past medical history of COVID-19, Diverticulitis, Hiatal hernia, and Reflux. Ms. Keenan Snyder  has a past surgical history that includes Colonoscopy ().   Social History/Living Environment:   Lives With: Spouse  Home Layout: One level     Prior Level of Function/Work/Activity:   Prior level of function: Independent  Current level of function: independent       Learning:   Does the patient/guardian have any barriers to

## 2023-08-07 ENCOUNTER — HOSPITAL ENCOUNTER (OUTPATIENT)
Dept: PHYSICAL THERAPY | Age: 58
Setting detail: RECURRING SERIES
Discharge: HOME | End: 2023-08-10
Payer: COMMERCIAL

## 2023-08-07 PROCEDURE — 97110 THERAPEUTIC EXERCISES: CPT

## 2023-08-07 PROCEDURE — 97140 MANUAL THERAPY 1/> REGIONS: CPT

## 2023-08-07 ASSESSMENT — PAIN SCALES - GENERAL
PAINLEVEL_OUTOF10: 4
PAINLEVEL_OUTOF10: 3

## 2023-08-07 NOTE — PROGRESS NOTES
Jordan Minus  : 1965  Primary: Allie Calixto Sc (Arjun CARABALLO)  Secondary:  [facility]  [address] Roldan Robles [address] [place] [address]  Phone: [phone]  Fax: [phone] Plan Frequency: 2 x week for 6 weeks  Plan of Care/Certification Expiration Date: 10/03/23      >PT Visit Info:  Plan Frequency: 2 x week for 6 weeks  Plan of Care/Certification Expiration Date: 10/03/23  Total # of Visits to Date: 2      Visit Count:  2    OUTPATIENT PHYSICAL THERAPY:OP NOTE TYPE: OP Note Type: Treatment Note 2023       Episode  }Appt Desk             Treatment Diagnosis:  Pain in Right Shoulder (M25.511)  Stiffness of Right Shoulder, Not elsewhere classified (M25.611)  Medical/Referring Diagnosis:  Acute pain of right shoulder [M25.511]  Referring Physician:  AWA Ingram NP, MD Orders:  PT Eval and Treat   Date of Onset:  Onset Date: 23     Allergies:   Penicillins       Interventions Planned (Treatment may consist of any combination of the following):    Current Treatment Recommendations: Strengthening; ROM; Manual; Home exercise program; Modalities     >Subjective Comments:  Pt feeling okay. Still has pain in her shoulder. >Initial:     4/10>Post Session:       4/10  Medications Last Reviewed:  2023  Updated Objective Findings:  None Today  Treatment   Access Code: [email or web address]  URL: [email or web address]  Date: 2023  Prepared by: José Burnette    Exercises  - Standing Shoulder Flexion AAROM with Dowel  - 2 x daily - 10 reps  - Standing Shoulder Abduction AAROM with Dowel  - 2 x daily - 10 reps  - Standing Shoulder External Rotation AAROM with Dowel  - 2 x daily - 10 reps  - Flexion-Extension Shoulder Pendulum with Table Support  - 2 x daily - 10 reps  - Horizontal Shoulder Pendulum with Table Support  - 2 x daily - 10 reps  THERAPEUTIC EXERCISE: (30 minutes):    Exercises per grid below to improve mobility, strength, and coordination.   Required minimal verbal cues to

## 2023-08-09 ENCOUNTER — HOSPITAL ENCOUNTER (OUTPATIENT)
Dept: PHYSICAL THERAPY | Age: 58
Setting detail: RECURRING SERIES
Discharge: HOME | End: 2023-08-12
Payer: COMMERCIAL

## 2023-08-09 PROCEDURE — 97110 THERAPEUTIC EXERCISES: CPT

## 2023-08-09 PROCEDURE — 97140 MANUAL THERAPY 1/> REGIONS: CPT

## 2023-08-09 ASSESSMENT — PAIN SCALES - GENERAL: PAINLEVEL_OUTOF10: 4

## 2023-08-09 NOTE — PROGRESS NOTES
Olita Boas  : 1965  Primary: Yuni Gould Sc ([facility]  Secondary:  [facility]  [address] Flory Kowalski [address] [place] [address]  Phone: [phone]  Fax: [phone] Plan Frequency: 2 x week for 6 weeks  Plan of Care/Certification Expiration Date: 10/03/23      >PT Visit Info:  Plan Frequency: 2 x week for 6 weeks  Plan of Care/Certification Expiration Date: 10/03/23  Total # of Visits to Date: 3      Visit Count:  3    OUTPATIENT PHYSICAL THERAPY:OP NOTE TYPE: OP Note Type: Treatment Note 2023       Episode  }Appt Desk             Treatment Diagnosis:  Pain in Right Shoulder (M25.511)  Stiffness of Right Shoulder, Not elsewhere classified (M25.611)  Medical/Referring Diagnosis:  Acute pain of right shoulder [M25.511]  Referring Physician:  AWA Lo NP, MD Orders:  PT Eval and Treat   Date of Onset:  Onset Date: 23     Allergies:   Penicillins       Interventions Planned (Treatment may consist of any combination of the following):    Current Treatment Recommendations: Strengthening; ROM; Manual; Home exercise program; Modalities     >Subjective Comments:  Pt felt soreness from exercises. It is more muscle soreness than pain. >Initial:     4/10>Post Session:       2/10  Medications Last Reviewed:  2023  Updated Objective Findings:  None Today  Treatment   Access Code: [email or web address]  URL: [email or web address]  Date: 2023  Prepared by: José Burnette    Exercises  - Standing Shoulder Flexion AAROM with Dowel  - 2 x daily - 10 reps  - Standing Shoulder Abduction AAROM with Dowel  - 2 x daily - 10 reps  - Standing Shoulder External Rotation AAROM with Dowel  - 2 x daily - 10 reps  - Flexion-Extension Shoulder Pendulum with Table Support  - 2 x daily - 10 reps  - Horizontal Shoulder Pendulum with Table Support  - 2 x daily - 10 reps  THERAPEUTIC EXERCISE: (30 minutes):    Exercises per grid below to improve mobility, strength, and coordination.   Required

## 2023-08-14 ENCOUNTER — HOSPITAL ENCOUNTER (OUTPATIENT)
Dept: PHYSICAL THERAPY | Age: 58
Setting detail: RECURRING SERIES
Discharge: HOME | End: 2023-08-17
Payer: COMMERCIAL

## 2023-08-14 PROCEDURE — 97110 THERAPEUTIC EXERCISES: CPT

## 2023-08-14 ASSESSMENT — PAIN SCALES - GENERAL: PAINLEVEL_OUTOF10: 4

## 2023-08-14 NOTE — PROGRESS NOTES
Lizbethgogo Shavonne  : 1965  Primary: [facility])  Secondary:  [facility]  [address]  [place] [address]  Phone: [phone]  Fax: [phone] Plan Frequency: 2 x week for 6 weeks  Plan of Care/Certification Expiration Date: 10/03/23      >PT Visit Info:  Plan Frequency: 2 x week for 6 weeks  Plan of Care/Certification Expiration Date: 10/03/23  Total # of Visits to Date: 4      Visit Count:  4    OUTPATIENT PHYSICAL THERAPY:OP NOTE TYPE: OP Note Type: Treatment Note 2023       Episode  }Appt Desk             Treatment Diagnosis:  Pain in Right Shoulder (M25.511)  Stiffness of Right Shoulder, Not elsewhere classified (M25.611)  Medical/Referring Diagnosis:  No admission diagnoses are documented for this encounter. Referring Physician:  AWA Brown NP, MD Orders:  PT Eval and Treat   Date of Onset:  Onset Date: 23     Allergies:   Penicillins       Interventions Planned (Treatment may consist of any combination of the following):    Current Treatment Recommendations: Strengthening; ROM; Manual; Home exercise program; Modalities     >Subjective Comments:  Reports pain mainly posteriolateral shoulder. >Initial:     4/10>Post Session:       3/10  Medications Last Reviewed:  2023  Updated Objective Findings:  None Today  Treatment   Access Code: [email or web address]  URL: [email or web address]  Date: 2023  Prepared by: José Burnette    Exercises  - Standing Shoulder Flexion AAROM with Dowel  - 2 x daily - 10 reps  - Standing Shoulder Abduction AAROM with Dowel  - 2 x daily - 10 reps  - Standing Shoulder External Rotation AAROM with Dowel  - 2 x daily - 10 reps  - Flexion-Extension Shoulder Pendulum with Table Support  - 2 x daily - 10 reps  - Horizontal Shoulder Pendulum with Table Support  - 2 x daily - 10 reps  THERAPEUTIC EXERCISE: (40 minutes):    Exercises per grid below to improve mobility, strength, and coordination.   Required

## 2023-08-16 ENCOUNTER — HOSPITAL ENCOUNTER (OUTPATIENT)
Dept: PHYSICAL THERAPY | Age: 58
Setting detail: RECURRING SERIES
Discharge: HOME | End: 2023-08-19
Payer: COMMERCIAL

## 2023-08-16 ENCOUNTER — TELEPHONE (OUTPATIENT)
Dept: FAMILY MEDICINE CLINIC | Facility: CLINIC | Age: 58
End: 2023-08-16

## 2023-08-16 ENCOUNTER — INITIAL CONSULT (OUTPATIENT)
Age: 58
End: 2023-08-16

## 2023-08-16 VITALS
HEART RATE: 83 BPM | BODY MASS INDEX: 38.24 KG/M2 | DIASTOLIC BLOOD PRESSURE: 78 MMHG | WEIGHT: 224 LBS | HEIGHT: 64 IN | SYSTOLIC BLOOD PRESSURE: 138 MMHG

## 2023-08-16 DIAGNOSIS — M25.511 ACUTE PAIN OF RIGHT SHOULDER: Primary | ICD-10-CM

## 2023-08-16 DIAGNOSIS — E78.5 HYPERLIPIDEMIA LDL GOAL <130: ICD-10-CM

## 2023-08-16 DIAGNOSIS — F17.218 CIGARETTE NICOTINE DEPENDENCE WITH OTHER NICOTINE-INDUCED DISORDER: ICD-10-CM

## 2023-08-16 DIAGNOSIS — R01.1 HEART MURMUR: Primary | ICD-10-CM

## 2023-08-16 DIAGNOSIS — I10 HYPERTENSION, UNCONTROLLED: ICD-10-CM

## 2023-08-16 PROCEDURE — 97110 THERAPEUTIC EXERCISES: CPT

## 2023-08-16 RX ORDER — NICOTINE 21 MG/24HR
PATCH, TRANSDERMAL 24 HOURS TRANSDERMAL
Qty: 14 PATCH | Refills: 3 | Status: SHIPPED | OUTPATIENT
Start: 2023-08-16

## 2023-08-16 RX ORDER — BUPROPION HYDROCHLORIDE 150 MG/1
150 TABLET, EXTENDED RELEASE ORAL 2 TIMES DAILY
Qty: 180 TABLET | Refills: 0 | Status: SHIPPED | OUTPATIENT
Start: 2023-08-16

## 2023-08-16 RX ORDER — BUPROPION HYDROCHLORIDE 150 MG/1
TABLET, EXTENDED RELEASE ORAL
Qty: 3 TABLET | Refills: 0 | Status: SHIPPED | OUTPATIENT
Start: 2023-08-16

## 2023-08-16 ASSESSMENT — ENCOUNTER SYMPTOMS
NAUSEA: 0
COUGH: 0
DIARRHEA: 0
SORE THROAT: 0
SHORTNESS OF BREATH: 0
VOMITING: 0
SUSPICIOUS LESIONS: 0
ABDOMINAL PAIN: 0

## 2023-08-16 NOTE — TELEPHONE ENCOUNTER
Pt called and said the referral to [facility] never went through. She wanted to be referred to someone with our hospital since it been a few weeks for her shoulder. We don't need to use [facility] if we can't get the referrals to go through to them.

## 2023-08-16 NOTE — TELEPHONE ENCOUNTER
She said she wanted to go to one of our ortho guys and not [facility]. Her referral has been sent to them several times and they are still saying they didn't get it. The referral person for our office said its taking 45 mins to get referrals to go through to them.

## 2023-08-16 NOTE — PROGRESS NOTES
Sara Kruse  : 1965  Primary: Agata Muniz Sc ([facility])  Secondary:  [facility]  [address] Chi Abdul [address] [place] [address]  Phone: [phone]  Fax: [phone] Plan Frequency: 2 x week for 6 weeks  Plan of Care/Certification Expiration Date: 10/03/23      >PT Visit Info:  Plan Frequency: 2 x week for 6 weeks  Plan of Care/Certification Expiration Date: 10/03/23  Total # of Visits to Date: 5      Visit Count:  5    OUTPATIENT PHYSICAL THERAPY:OP NOTE TYPE: OP Note Type: Treatment Note 2023       Episode  }Appt Desk             Treatment Diagnosis:  Pain in Right Shoulder (M25.511)  Stiffness of Right Shoulder, Not elsewhere classified (M25.611)  Medical/Referring Diagnosis:  No admission diagnoses are documented for this encounter. Referring Physician:  AWA Garcia NP, MD Orders:  PT Eval and Treat   Date of Onset:  Onset Date: 23     Allergies:   Penicillins       Interventions Planned (Treatment may consist of any combination of the following):    Current Treatment Recommendations: Strengthening; ROM; Manual; Home exercise program; Modalities     >Subjective Comments:  reports increased soreness in shoulder today after rolling over onto right shoulder during sleep last  night  >Initial:      /10>Post Session:       2/10  Medications Last Reviewed:  2023  Updated Objective Findings:  None Today  Treatment   Access Code: [email or web address]  URL: [email or web address]  Date: 2023  Prepared by:  José Burnette    Exercises  - Standing Shoulder Flexion AAROM with Dowel  - 2 x daily - 10 reps  - Standing Shoulder Abduction AAROM with Dowel  - 2 x daily - 10 reps  - Standing Shoulder External Rotation AAROM with Dowel  - 2 x daily - 10 reps  - Flexion-Extension Shoulder Pendulum with Table Support  - 2 x daily - 10 reps  - Horizontal Shoulder Pendulum with Table Support  - 2 x daily - 10 reps  THERAPEUTIC EXERCISE: (40 minutes):    Exercises per grid below

## 2023-08-16 NOTE — PROGRESS NOTES
[address] [facility], [address]  PHONE: [phone]    SUBJECTIVE:   Maddie Garcia is a 60 y.o. female 1965   seen for a consultation visit regarding the following:     Chief Complaint   Patient presents with    Consultation    Heart Murmur     Had Echo              Consultation is requested for evaluation of Consultation and Heart Murmur (Had Echo)   . History of present illness: 60 y.o. female with PMH HTN, HLD, nicotine dependence, BLAYNE presenting for evaluation of a heart murmur. Patient was told she had a murmur several years ago by her pulmonologist. When she was at her most recent pulmonology appointment, she inquired about the significance of this and an echo was ordered. The echo was normal except for mild left atrial enlargement. She denies chest pain or HERNANDEZ. She reports occasional palpitations with the feeling of skipped beat. She denies light-headedness or syncope. No other acute complaints. Past Medical History, Past Surgical History, Family history, Social History, and Medications were all reviewed with the patient today and updated as necessary. Allergies   Allergen Reactions    Penicillins Rash     Other reaction(s): Rash-Allergy  Other reaction(s): Rash-Allergy     Past Medical History:   Diagnosis Date    COVID-19 10/2022    Diverticulitis     dx with ct anfd by colonoscopy    Hiatal hernia     Reflux      Past Surgical History:   Procedure Laterality Date    COLONOSCOPY  2014     Family History   Problem Relation Age of Onset    Hypertension Father     Diabetes Mother     Hypertension Mother      Social History     Tobacco Use    Smoking status: Every Day     Packs/day: 0.50     Years: 37.00     Pack years: 18.50     Types: Cigarettes     Start date: 11/24/1985    Smokeless tobacco: Never   Substance Use Topics    Alcohol use:  Yes     Alcohol/week: 21.0 standard drinks       ROS:    Review of Systems   Constitutional: Negative for chills, fever,

## 2023-08-21 ENCOUNTER — HOSPITAL ENCOUNTER (OUTPATIENT)
Dept: PHYSICAL THERAPY | Age: 58
Setting detail: RECURRING SERIES
Discharge: HOME | End: 2023-08-24
Payer: COMMERCIAL

## 2023-08-21 PROCEDURE — 97110 THERAPEUTIC EXERCISES: CPT

## 2023-08-21 ASSESSMENT — PAIN SCALES - GENERAL: PAINLEVEL_OUTOF10: 4

## 2023-08-21 NOTE — PROGRESS NOTES
session: Next visit will focus on progressive shoulder stabilization.     >Total Treatment Billable Duration:  40 minutes Therex,   Time In: 1610  Time Out: 1650    Jeffrey Shen, PT       Charge Capture  }Post Session Pain  PT Visit Info  MedBridge Portal  MD Guidelines  Scanned Media  Benefits  MyChart    Future Appointments   Date Time Provider [address]   8/23/2023  4:00 PM John Sterling, PT [facility]   8/25/2023  1:30 PM Lary Morgan [facility]   8/28/2023  4:00 PM John Sterling, PT [facility]   8/30/2023  4:00 PM John Sterling, PT [facility]   9/5/2023  4:00 PM John Sterling, PT [facility]   9/7/2023  4:00 PM John Sterling, PT [facility]   9/11/2023  4:00 PM John Sterling, PT [facility]   9/13/2023  4:00 PM John Sterling, PT [facility]   11/10/2023  8:20 AM AWA Garcia NP [facility] AMB

## 2023-08-23 ENCOUNTER — HOSPITAL ENCOUNTER (OUTPATIENT)
Dept: PHYSICAL THERAPY | Age: 58
Setting detail: RECURRING SERIES
Discharge: HOME | End: 2023-08-26
Payer: COMMERCIAL

## 2023-08-23 PROCEDURE — 97140 MANUAL THERAPY 1/> REGIONS: CPT

## 2023-08-23 PROCEDURE — 97110 THERAPEUTIC EXERCISES: CPT

## 2023-08-23 ASSESSMENT — PAIN SCALES - GENERAL: PAINLEVEL_OUTOF10: 2

## 2023-08-23 NOTE — PROGRESS NOTES
Shannon Tucker  : 1965  Primary: Berny Painter Sc ([address])  Secondary:  O MILLENNIUM  [address] Roxanne Okeefe [address] [place] [address]  Phone: [phone]  Fax: [phone] Plan Frequency: 2 x week for 6 weeks  Plan of Care/Certification Expiration Date: 10/03/23      >PT Visit Info:  Plan Frequency: 2 x week for 6 weeks  Plan of Care/Certification Expiration Date: 10/03/23  Total # of Visits to Date: 7      Visit Count:  7    OUTPATIENT PHYSICAL THERAPY:OP NOTE TYPE: OP Note Type: Treatment Note 2023       Episode  }Appt Desk             Treatment Diagnosis:  Pain in Right Shoulder (M25.511)  Stiffness of Right Shoulder, Not elsewhere classified (M25.611)  Medical/Referring Diagnosis:  No admission diagnoses are documented for this encounter. Referring Physician:  AWA Moran NP, MD Orders:  PT Eval and Treat   Date of Onset:  Onset Date: 23     Allergies:   Penicillins       Interventions Planned (Treatment may consist of any combination of the following):    Current Treatment Recommendations: Strengthening; ROM; Manual; Home exercise program; Modalities     >Subjective Comments:  Patient reports more of ache than pain last couple of days. Appointment with POA now moved to 23.  >Initial:     2/10>Post Session:       2/10  Medications Last Reviewed:  2023  Updated Objective Findings:  None Today  Treatment   Access Code: [email or web address]  URL: [email or web address]  Date: 2023  Prepared by:  José Burnette    Exercises  - Standing Shoulder Flexion AAROM with Dowel  - 2 x daily - 10 reps  - Standing Shoulder Abduction AAROM with Dowel  - 2 x daily - 10 reps  - Standing Shoulder External Rotation AAROM with Dowel  - 2 x daily - 10 reps  - Flexion-Extension Shoulder Pendulum with Table Support  - 2 x daily - 10 reps  - Horizontal Shoulder Pendulum with Table Support  - 2 x daily - 10 reps  THERAPEUTIC EXERCISE: (30 minutes):    Exercises per grid below to

## 2023-08-28 ENCOUNTER — HOSPITAL ENCOUNTER (OUTPATIENT)
Dept: PHYSICAL THERAPY | Age: 58
Setting detail: RECURRING SERIES
Discharge: HOME | End: 2023-08-31
Payer: COMMERCIAL

## 2023-08-28 PROCEDURE — 97110 THERAPEUTIC EXERCISES: CPT

## 2023-08-28 ASSESSMENT — PAIN SCALES - GENERAL: PAINLEVEL_OUTOF10: 1

## 2023-08-28 NOTE — PROGRESS NOTES
Khoa Wolff  : 1965  Primary: Mark Snell Sc ([address])  Secondary:  [facility] MILLENNIUM  [address] [place] [address]  Phone: [phone]  Fax: [phone] Plan Frequency: 2 x week for 6 weeks  Plan of Care/Certification Expiration Date: 10/03/23      >PT Visit Info:  Plan Frequency: 2 x week for 6 weeks  Plan of Care/Certification Expiration Date: 10/03/23  Total # of Visits to Date: 8      Visit Count:  8    OUTPATIENT PHYSICAL THERAPY:OP NOTE TYPE: OP Note Type: Treatment Note 2023       Episode  }Appt Desk             Treatment Diagnosis:  Pain in Right Shoulder (M25.511)  Stiffness of Right Shoulder, Not elsewhere classified (M25.611)  Medical/Referring Diagnosis:  No admission diagnoses are documented for this encounter. Referring Physician:  AWA Miller NP, MD Orders:  PT Eval and Treat   Date of Onset:  Onset Date: 23     Allergies:   Penicillins       Interventions Planned (Treatment may consist of any combination of the following):    Current Treatment Recommendations: Strengthening; ROM; Manual; Home exercise program; Modalities     >Subjective Comments:  Reports minimal pain today. >Initial:     1/10>Post Session:       1/10  Medications Last Reviewed:  2023  Updated Objective Findings:  None Today  Treatment   Access Code: [email or web address]  URL: [email or web address]  Date: 2023  Prepared by: José Burnette    Exercises  - Standing Shoulder Flexion AAROM with Dowel  - 2 x daily - 10 reps  - Standing Shoulder Abduction AAROM with Dowel  - 2 x daily - 10 reps  - Standing Shoulder External Rotation AAROM with Dowel  - 2 x daily - 10 reps  - Flexion-Extension Shoulder Pendulum with Table Support  - 2 x daily - 10 reps  - Horizontal Shoulder Pendulum with Table Support  - 2 x daily - 10 reps  THERAPEUTIC EXERCISE: (40 minutes):    Exercises per grid below to improve mobility, strength, and coordination.   Required minimal verbal cues to

## 2023-08-30 ENCOUNTER — HOSPITAL ENCOUNTER (OUTPATIENT)
Dept: PHYSICAL THERAPY | Age: 58
Setting detail: RECURRING SERIES
Discharge: HOME | End: 2023-09-02
Payer: COMMERCIAL

## 2023-08-30 PROCEDURE — 97110 THERAPEUTIC EXERCISES: CPT

## 2023-08-31 ENCOUNTER — OFFICE VISIT (OUTPATIENT)
Dept: ORTHOPEDIC SURGERY | Age: 58
End: 2023-08-31
Payer: COMMERCIAL

## 2023-08-31 VITALS — WEIGHT: 224 LBS | BODY MASS INDEX: 38.24 KG/M2 | HEIGHT: 64 IN

## 2023-08-31 DIAGNOSIS — M75.101 TEAR OF RIGHT ROTATOR CUFF, UNSPECIFIED TEAR EXTENT, UNSPECIFIED WHETHER TRAUMATIC: ICD-10-CM

## 2023-08-31 DIAGNOSIS — F40.240 CLAUSTROPHOBIA: Primary | ICD-10-CM

## 2023-08-31 DIAGNOSIS — M25.511 RIGHT SHOULDER PAIN, UNSPECIFIED CHRONICITY: Primary | ICD-10-CM

## 2023-08-31 PROCEDURE — 99204 OFFICE O/P NEW MOD 45 MIN: CPT | Performed by: PHYSICIAN ASSISTANT

## 2023-08-31 RX ORDER — ALPRAZOLAM 0.5 MG/1
0.5 TABLET ORAL ONCE AS NEEDED
Qty: 2 TABLET | Refills: 0 | Status: SHIPPED | OUTPATIENT
Start: 2023-08-31 | End: 2023-09-01

## 2023-08-31 ASSESSMENT — PAIN SCALES - GENERAL: PAINLEVEL_OUTOF10: 1

## 2023-08-31 NOTE — PROGRESS NOTES
Name: Crystal Cedeno  YOB: 1965  Gender: female  MRN: [id number]    CC:   Chief Complaint   Patient presents with    New Patient     Right shoulder pain    Right shoulder pain    HPI:  This patient presents with a July 12th  history of Right shoulder pain. Th epatients she was on vacation on a catamaran and went down some stairs and the surface had dew on it and her feet went out and she grabbed a railing. Patient notes significant pain. Unsure if popped due to everything happening so fast.  Patient notes lateral and anterior shoulder. Denies numbness and tingling and tingling. Occasional popping. No prior injury. Patient has been doing PT with mild relief. Notes continued pain. Patient is RHD. Computer worker. Allergies   Allergen Reactions    Penicillins Rash     Other reaction(s): Rash-Allergy  Other reaction(s): Rash-Allergy     Past Medical History:   Diagnosis Date    COVID-19 10/2022    Diverticulitis     dx with ct anfd by colonoscopy    Hiatal hernia     Reflux      Past Surgical History:   Procedure Laterality Date    COLONOSCOPY  2014     Family History   Problem Relation Age of Onset    Hypertension Father     Diabetes Mother     Hypertension Mother      Social History     Socioeconomic History    Marital status: Life Partner     Spouse name: Not on file    Number of children: Not on file    Years of education: Not on file    Highest education level: Not on file   Occupational History    Not on file   Tobacco Use    Smoking status: Every Day     Packs/day: 0.50     Years: 37.00     Pack years: 18.50     Types: Cigarettes     Start date: 11/24/1985    Smokeless tobacco: Never   Substance and Sexual Activity    Alcohol use: Yes     Alcohol/week: 21.0 standard drinks    Drug use: No    Sexual activity: Not on file   Other Topics Concern    Not on file   Social History Narrative    Lives with Esther Velazquez.  Together since 2007 partnerB     Social Determinants of Health

## 2023-08-31 NOTE — PROGRESS NOTES
Objective Findings:  None Today  Treatment   Access Code: [email or web address]  URL: [email or web address]  Date: 08/02/2023  Prepared by: José Burnette    Exercises  - Standing Shoulder Flexion AAROM with Dowel  - 2 x daily - 10 reps  - Standing Shoulder Abduction AAROM with Dowel  - 2 x daily - 10 reps  - Standing Shoulder External Rotation AAROM with Dowel  - 2 x daily - 10 reps  - Flexion-Extension Shoulder Pendulum with Table Support  - 2 x daily - 10 reps  - Horizontal Shoulder Pendulum with Table Support  - 2 x daily - 10 reps    THERAPEUTIC EXERCISE: (40 minutes):    Exercises per grid below to improve mobility, strength, and coordination. Required minimal verbal cues to promote proper body alignment and promote proper body mechanics. Progressed resistance, range, repetitions, and complexity of movement as indicated.    Date:  8/9/23 Date:  8/14/23 Date:  8/16/23 Date:  8/21/23 Date:  8/23/23 Date:  8/28/23 Date:  8/30/23   Activity/Exercise Parameters Parameters        HEP          UBE 4/4 2.0  5/5 L2 5/5 L2 5/5; L3 5/5; L3 5/5; L3 5/5; L3   Pulley          Rows 2x10 prone 3# 2 x 10 prone 3# 2 x 10 prone 3# 2 x 10  prone 3# 2 x 10  prone GTB x 10 GTB x 10   Ts 2x10 prone 3# 2 x 10 prone 3# 2 x 10 prone 0# 2 x 10  prone 0# 2 x 10  prone GTB x 10 GTB x 10   Ms 2x10 prone 3# 2 x 10 prone 3# 2 x 10 prone 3# 2 x 10  prone 3# 2 x 10  prone Extension  GTB x 10 Extension  GTB x 10   Flexion 2x10 3# B supine 2x10 3#  supine 2x10 3#  supine 3# 2 x 10  supine 3# 2 x 10  prone Tree hugger  GTB x 10 Tree hugger  GTB x 10   Abduction 2x10 3# sidelying regular  2x10 3# horizontal 2x10 3# sidelying regular  2x10 3# horizontal 2x10 3# sidelying regular  2x10 3# horizontal 2x10 3# sidelying regular  2x10 3# horizontal      ER 2x10 3# R sidelying 2x10 3# L sidelying 2x10 3# L sidelying 2x10 3# L sidelying  2x10 3# L sidelying 2x10 3# L sidelying   IR 2x10 3# R sidelying 2x10 3# R sidelying        Serratus press 2x10 3# B

## 2023-09-05 ENCOUNTER — HOSPITAL ENCOUNTER (OUTPATIENT)
Dept: PHYSICAL THERAPY | Age: 58
Setting detail: RECURRING SERIES
Discharge: HOME | End: 2023-09-08
Payer: COMMERCIAL

## 2023-09-05 PROCEDURE — 97110 THERAPEUTIC EXERCISES: CPT

## 2023-09-05 ASSESSMENT — PAIN DESCRIPTION - ORIENTATION: ORIENTATION: RIGHT

## 2023-09-05 ASSESSMENT — PAIN DESCRIPTION - LOCATION: LOCATION: SHOULDER

## 2023-09-05 ASSESSMENT — PAIN SCALES - GENERAL: PAINLEVEL_OUTOF10: 1

## 2023-09-05 NOTE — PROGRESS NOTES
3#  supine 2x10 3#  supine 3# 2 x 10  supine 3# 2 x 10  prone Tree hugger  GTB x 10 Tree hugger  GTB x 10   Abduction 2x10 3# sidelying regular  2x10 3# horizontal 2x10 3# sidelying regular  2x10 3# horizontal 2x10 3# sidelying regular  2x10 3# horizontal 2x10 3# sidelying regular  2x10 3# horizontal      ER 2x10 3# R sidelying 2x10 3# L sidelying 2x10 3# L sidelying 2x10 3# L sidelying  2x10 3# L sidelying 2x10 3# L sidelying   IR 2x10 3# R sidelying 2x10 3# R sidelying        Serratus press 2x10 3# B supine 2x10 3#  supine 2x10 3#  supine 2x10 3#  supine      Pec stretch   Door frame 10 x 5\" Door frame 10 x 5\" Door frame 10 x 5\" Door frame 10 x 5\" Door frame 10 x 5\"   Door push up       X 10   TB static ER walkouts  GTB 3 streps x 10 GTB 3 steps x 10 GTB 3 steps x 10 GTB 3 steps x 10 GTB 3 steps x 10 GTB 3 steps x 10   TB static IR walkouts  GTB 3 streps x 10 GTB 3 steps x 10 GTB 3 steps x 10 GTB 3 steps x 10 GTB 3 steps x 10 GTB 3 steps x 10   Door pushups  2 x 10 X 10 X 10      shrugs   Standing  2# x 10 Stdg; 3# x 10 Stdg; 3# x 10 3# x 10 3# x 10   Lateral raise   Standing  2# x 10 Stdg; 3# x 10 Stdg; 3# x 10 3# x 10 3# x 10   scaption   Standing  2# x 10 Stdg; 3# x 10 Stdg; 3# x 10 3# x 10 3# x 10   Kinesiotaping to unload R shoulder    Done        MANUAL THERAPY: (0 minutes):   Joint mobilization and Soft tissue mobilization was utilized and necessary because of the patient's restricted joint motion and restricted motion of soft tissue. PROM flx, abd, ER,IR, horiz add     Treatment/Session Summary:    >Treatment Assessment:  Discussed patient following HEP independently until MRI completed. Patient agrees with this plan. We will place on hold until MRI results are in. Communication/Consultation:  None today  Equipment provided today:  none  Recommendations/Intent for next treatment session: Next visit will focus on progressive shoulder stabilization.     >Total Treatment Billable Duration:  40 minutes

## 2023-09-07 ENCOUNTER — APPOINTMENT (OUTPATIENT)
Dept: PHYSICAL THERAPY | Age: 58
End: 2023-09-07
Payer: COMMERCIAL

## 2023-09-12 ENCOUNTER — APPOINTMENT (OUTPATIENT)
Dept: PHYSICAL THERAPY | Age: 58
End: 2023-09-12
Payer: COMMERCIAL

## 2023-09-14 ENCOUNTER — APPOINTMENT (OUTPATIENT)
Dept: PHYSICAL THERAPY | Age: 58
End: 2023-09-14
Payer: COMMERCIAL

## 2023-09-28 ENCOUNTER — OFFICE VISIT (OUTPATIENT)
Dept: ORTHOPEDIC SURGERY | Age: 58
End: 2023-09-28

## 2023-09-28 DIAGNOSIS — M75.101 TEAR OF RIGHT ROTATOR CUFF, UNSPECIFIED TEAR EXTENT, UNSPECIFIED WHETHER TRAUMATIC: ICD-10-CM

## 2023-09-28 DIAGNOSIS — M25.511 RIGHT SHOULDER PAIN, UNSPECIFIED CHRONICITY: Primary | ICD-10-CM

## 2023-09-28 RX ORDER — METHYLPREDNISOLONE ACETATE 40 MG/ML
80 INJECTION, SUSPENSION INTRA-ARTICULAR; INTRALESIONAL; INTRAMUSCULAR; SOFT TISSUE ONCE
Status: COMPLETED | OUTPATIENT
Start: 2023-09-28 | End: 2023-09-28

## 2023-09-28 RX ADMIN — METHYLPREDNISOLONE ACETATE 80 MG: 40 INJECTION, SUSPENSION INTRA-ARTICULAR; INTRALESIONAL; INTRAMUSCULAR; SOFT TISSUE at 13:18

## 2023-09-28 NOTE — PROGRESS NOTES
drinks of alcohol    Drug use: No    Sexual activity: Not on file   Other Topics Concern    Not on file   Social History Narrative    Lives with Martina Velazquez. Together since 2007 partnerB     Social Determinants of Health     Financial Resource Strain: Low Risk  (2/14/2023)    Overall Financial Resource Strain (CARDIA)     Difficulty of Paying Living Expenses: Not hard at all   Food Insecurity: No Food Insecurity (2/14/2023)    Hunger Vital Sign     Worried About Running Out of Food in the Last Year: Never true     Ran Out of Food in the Last Year: Never true   Transportation Needs: Unknown (2/14/2023)    PRAPARE - Transportation     Lack of Transportation (Medical): Not on file     Lack of Transportation (Non-Medical): No   Physical Activity: Not on file   Stress: Not on file   Social Connections: Not on file   Intimate Partner Violence: Not on file   Housing Stability: Unknown (2/14/2023)    Housing Stability Vital Sign     Unable to Pay for Housing in the Last Year: Not on file     Number of Places Lived in the Last Year: Not on file     Unstable Housing in the Last Year: No               No data to display                Review of Systems  Non-contributory    PE:       SHOULDER   Right (Involved) left   Skin Intact Intact   Radial Pulses 2+ Symmetrical 2+ Symmetrical   Deformity None Normal   Myotomes Normal Normal   Dermatomes  Normal Normal    ROM Full Full   Strength 4+/5 abduction, 5/5 external rotation, 5/5 internal rotation, pain with abduction No weakness   Atrophy None noted None noted   Effusion/Swelling  None noted None noted   Palpation Significant improvement with TTP at the shoulder biceps tendon, negative tenderness to the [facility] joint No tenderness   Bicep Tendon Rupture  Negative  Negative signs   Bear Hug, Belly Press Negative, negative  Negative   Crossed Arm Adduction Test Normal      Instability/Ant.  Apprehension Test None noted None noted   Impingement Positive   Netsering perez, AOM Negative

## 2023-11-07 ASSESSMENT — PATIENT HEALTH QUESTIONNAIRE - PHQ9
2. FEELING DOWN, DEPRESSED OR HOPELESS: 0
SUM OF ALL RESPONSES TO PHQ QUESTIONS 1-9: 0
SUM OF ALL RESPONSES TO PHQ QUESTIONS 1-9: 0
2. FEELING DOWN, DEPRESSED OR HOPELESS: NOT AT ALL
SUM OF ALL RESPONSES TO PHQ QUESTIONS 1-9: 0
SUM OF ALL RESPONSES TO PHQ9 QUESTIONS 1 & 2: 0
1. LITTLE INTEREST OR PLEASURE IN DOING THINGS: NOT AT ALL
SUM OF ALL RESPONSES TO PHQ QUESTIONS 1-9: 0
SUM OF ALL RESPONSES TO PHQ9 QUESTIONS 1 & 2: 0
1. LITTLE INTEREST OR PLEASURE IN DOING THINGS: 0

## 2023-11-10 ENCOUNTER — OFFICE VISIT (OUTPATIENT)
Dept: FAMILY MEDICINE CLINIC | Facility: CLINIC | Age: 58
End: 2023-11-10

## 2023-11-10 VITALS
WEIGHT: 221 LBS | HEART RATE: 84 BPM | BODY MASS INDEX: 37.73 KG/M2 | TEMPERATURE: 98 F | DIASTOLIC BLOOD PRESSURE: 73 MMHG | HEIGHT: 64 IN | SYSTOLIC BLOOD PRESSURE: 127 MMHG

## 2023-11-10 DIAGNOSIS — R73.9 ELEVATED BLOOD SUGAR: ICD-10-CM

## 2023-11-10 DIAGNOSIS — K21.00 GASTRO-ESOPHAGEAL REFLUX DISEASE WITH ESOPHAGITIS, WITHOUT BLEEDING: Primary | ICD-10-CM

## 2023-11-10 DIAGNOSIS — Z23 NEED FOR INFLUENZA VACCINATION: ICD-10-CM

## 2023-11-10 DIAGNOSIS — I10 ESSENTIAL (PRIMARY) HYPERTENSION: ICD-10-CM

## 2023-11-10 DIAGNOSIS — E78.49 OTHER HYPERLIPIDEMIA: ICD-10-CM

## 2023-11-10 DIAGNOSIS — M70.52 OTHER BURSITIS OF KNEE, LEFT KNEE: ICD-10-CM

## 2023-11-10 DIAGNOSIS — M85.80 OSTEOPENIA, UNSPECIFIED LOCATION: ICD-10-CM

## 2023-11-10 LAB
EST. AVERAGE GLUCOSE BLD GHB EST-MCNC: 134 MG/DL
HBA1C MFR BLD: 6.3 % (ref 4.8–5.6)

## 2023-11-10 RX ORDER — AMLODIPINE BESYLATE 5 MG/1
5 TABLET ORAL DAILY
Qty: 90 TABLET | Refills: 1 | Status: SHIPPED | OUTPATIENT
Start: 2023-11-10

## 2023-11-10 RX ORDER — PRAVASTATIN SODIUM 40 MG
40 TABLET ORAL NIGHTLY
Qty: 90 TABLET | Refills: 1 | Status: SHIPPED | OUTPATIENT
Start: 2023-11-10

## 2023-11-10 RX ORDER — LOSARTAN POTASSIUM 50 MG/1
50 TABLET ORAL DAILY
Qty: 90 TABLET | Refills: 1 | Status: SHIPPED | OUTPATIENT
Start: 2023-11-10

## 2023-11-10 RX ORDER — SPIRONOLACTONE 25 MG/1
25 TABLET ORAL DAILY
Qty: 90 TABLET | Refills: 1 | Status: SHIPPED | OUTPATIENT
Start: 2023-11-10

## 2023-11-10 RX ORDER — OMEPRAZOLE 40 MG/1
40 CAPSULE, DELAYED RELEASE ORAL DAILY
Qty: 90 CAPSULE | Refills: 1 | Status: SHIPPED | OUTPATIENT
Start: 2023-11-10

## 2023-11-10 ASSESSMENT — ENCOUNTER SYMPTOMS: SHORTNESS OF BREATH: 0

## 2023-11-11 LAB
ALBUMIN SERPL-MCNC: 4.1 G/DL (ref 3.5–5)
ALBUMIN/GLOB SERPL: 1.2 (ref 0.4–1.6)
ALP SERPL-CCNC: 71 U/L (ref 50–136)
ALT SERPL-CCNC: 42 U/L (ref 12–65)
ANION GAP SERPL CALC-SCNC: 7 MMOL/L (ref 2–11)
AST SERPL-CCNC: 23 U/L (ref 15–37)
BILIRUB SERPL-MCNC: 0.6 MG/DL (ref 0.2–1.1)
BUN SERPL-MCNC: 8 MG/DL (ref 6–23)
CALCIUM SERPL-MCNC: 9.4 MG/DL (ref 8.3–10.4)
CHLORIDE SERPL-SCNC: 106 MMOL/L (ref 101–110)
CO2 SERPL-SCNC: 24 MMOL/L (ref 21–32)
CREAT SERPL-MCNC: 0.8 MG/DL (ref 0.6–1)
GLOBULIN SER CALC-MCNC: 3.4 G/DL (ref 2.8–4.5)
GLUCOSE SERPL-MCNC: 134 MG/DL (ref 65–100)
POTASSIUM SERPL-SCNC: 4.3 MMOL/L (ref 3.5–5.1)
PROT SERPL-MCNC: 7.5 G/DL (ref 6.3–8.2)
SODIUM SERPL-SCNC: 137 MMOL/L (ref 133–143)

## 2023-11-16 ENCOUNTER — TELEPHONE (OUTPATIENT)
Dept: FAMILY MEDICINE CLINIC | Facility: CLINIC | Age: 58
End: 2023-11-16

## 2023-11-16 NOTE — TELEPHONE ENCOUNTER
----- Message from AWA Wong NP sent at 11/15/2023  7:46 AM EST -----  Ha1c is 6.3 so inching a bit higher. Exercise more!

## 2023-12-07 ENCOUNTER — HOSPITAL ENCOUNTER (OUTPATIENT)
Dept: MAMMOGRAPHY | Age: 58
Discharge: HOME | End: 2023-12-07
Payer: COMMERCIAL

## 2023-12-07 DIAGNOSIS — M85.80 OSTEOPENIA, UNSPECIFIED LOCATION: ICD-10-CM

## 2023-12-07 PROCEDURE — 77080 DXA BONE DENSITY AXIAL: CPT

## 2023-12-26 ENCOUNTER — TELEPHONE (OUTPATIENT)
Dept: FAMILY MEDICINE CLINIC | Facility: CLINIC | Age: 58
End: 2023-12-26

## 2023-12-26 NOTE — TELEPHONE ENCOUNTER
----- Message from AWA Natoin NP sent at 12/26/2023  7:41 AM EST -----  Bone density low but not to level of meds recommended I would suggest a calcium with vit D supplement And weight bearing exercise

## 2024-05-09 ENCOUNTER — OFFICE VISIT (OUTPATIENT)
Dept: FAMILY MEDICINE CLINIC | Facility: CLINIC | Age: 59
End: 2024-05-09
Payer: COMMERCIAL

## 2024-05-09 VITALS
TEMPERATURE: 98.2 F | DIASTOLIC BLOOD PRESSURE: 75 MMHG | WEIGHT: 223 LBS | HEIGHT: 64 IN | SYSTOLIC BLOOD PRESSURE: 136 MMHG | BODY MASS INDEX: 38.07 KG/M2 | HEART RATE: 92 BPM

## 2024-05-09 DIAGNOSIS — K21.00 GASTRO-ESOPHAGEAL REFLUX DISEASE WITH ESOPHAGITIS, WITHOUT BLEEDING: ICD-10-CM

## 2024-05-09 DIAGNOSIS — I10 ESSENTIAL (PRIMARY) HYPERTENSION: ICD-10-CM

## 2024-05-09 DIAGNOSIS — E78.49 OTHER HYPERLIPIDEMIA: ICD-10-CM

## 2024-05-09 DIAGNOSIS — M70.52 OTHER BURSITIS OF KNEE, LEFT KNEE: ICD-10-CM

## 2024-05-09 LAB
ALBUMIN SERPL-MCNC: 4.4 G/DL (ref 3.2–4.6)
ALBUMIN/GLOB SERPL: 1.5 (ref 1–1.9)
ALP SERPL-CCNC: 74 U/L (ref 35–104)
ALT SERPL-CCNC: 39 U/L (ref 12–65)
ANION GAP SERPL CALC-SCNC: 12 MMOL/L (ref 9–18)
AST SERPL-CCNC: 34 U/L (ref 15–37)
BILIRUB SERPL-MCNC: 0.9 MG/DL (ref 0–1.2)
BUN SERPL-MCNC: 11 MG/DL (ref 8–23)
CALCIUM SERPL-MCNC: 9.8 MG/DL (ref 8.8–10.2)
CHLORIDE SERPL-SCNC: 101 MMOL/L (ref 98–107)
CHOLEST SERPL-MCNC: 200 MG/DL (ref 0–200)
CO2 SERPL-SCNC: 25 MMOL/L (ref 20–28)
CREAT SERPL-MCNC: 0.69 MG/DL (ref 0.6–1.1)
GLOBULIN SER CALC-MCNC: 2.9 G/DL (ref 2.3–3.5)
GLUCOSE SERPL-MCNC: 164 MG/DL (ref 70–99)
HDLC SERPL-MCNC: 51 MG/DL (ref 40–60)
HDLC SERPL: 3.9 (ref 0–5)
LDLC SERPL CALC-MCNC: 120 MG/DL (ref 0–100)
POTASSIUM SERPL-SCNC: 4.4 MMOL/L (ref 3.5–5.1)
PROT SERPL-MCNC: 7.3 G/DL (ref 6.3–8.2)
SODIUM SERPL-SCNC: 138 MMOL/L (ref 136–145)
TRIGL SERPL-MCNC: 146 MG/DL (ref 0–150)
VLDLC SERPL CALC-MCNC: 29 MG/DL (ref 6–23)

## 2024-05-09 PROCEDURE — 3075F SYST BP GE 130 - 139MM HG: CPT | Performed by: NURSE PRACTITIONER

## 2024-05-09 PROCEDURE — 99214 OFFICE O/P EST MOD 30 MIN: CPT | Performed by: NURSE PRACTITIONER

## 2024-05-09 PROCEDURE — 3078F DIAST BP <80 MM HG: CPT | Performed by: NURSE PRACTITIONER

## 2024-05-09 RX ORDER — SPIRONOLACTONE 25 MG/1
25 TABLET ORAL DAILY
Qty: 90 TABLET | Refills: 1 | Status: SHIPPED | OUTPATIENT
Start: 2024-05-09

## 2024-05-09 RX ORDER — LOSARTAN POTASSIUM 50 MG/1
50 TABLET ORAL DAILY
Qty: 90 TABLET | Refills: 1 | Status: SHIPPED | OUTPATIENT
Start: 2024-05-09

## 2024-05-09 RX ORDER — PRAVASTATIN SODIUM 40 MG
40 TABLET ORAL NIGHTLY
Qty: 90 TABLET | Refills: 1 | Status: SHIPPED | OUTPATIENT
Start: 2024-05-09

## 2024-05-09 RX ORDER — AMLODIPINE BESYLATE 5 MG/1
5 TABLET ORAL DAILY
Qty: 90 TABLET | Refills: 1 | Status: SHIPPED | OUTPATIENT
Start: 2024-05-09

## 2024-05-09 RX ORDER — OMEPRAZOLE 40 MG/1
40 CAPSULE, DELAYED RELEASE ORAL DAILY
Qty: 90 CAPSULE | Refills: 1 | Status: SHIPPED | OUTPATIENT
Start: 2024-05-09

## 2024-05-09 SDOH — ECONOMIC STABILITY: FOOD INSECURITY: WITHIN THE PAST 12 MONTHS, THE FOOD YOU BOUGHT JUST DIDN'T LAST AND YOU DIDN'T HAVE MONEY TO GET MORE.: NEVER TRUE

## 2024-05-09 SDOH — ECONOMIC STABILITY: INCOME INSECURITY: HOW HARD IS IT FOR YOU TO PAY FOR THE VERY BASICS LIKE FOOD, HOUSING, MEDICAL CARE, AND HEATING?: NOT HARD AT ALL

## 2024-05-09 SDOH — ECONOMIC STABILITY: FOOD INSECURITY: WITHIN THE PAST 12 MONTHS, YOU WORRIED THAT YOUR FOOD WOULD RUN OUT BEFORE YOU GOT MONEY TO BUY MORE.: NEVER TRUE

## 2024-05-09 ASSESSMENT — ENCOUNTER SYMPTOMS
CHEST TIGHTNESS: 0
SHORTNESS OF BREATH: 0

## 2024-05-09 ASSESSMENT — PATIENT HEALTH QUESTIONNAIRE - PHQ9
SUM OF ALL RESPONSES TO PHQ QUESTIONS 1-9: 0
SUM OF ALL RESPONSES TO PHQ QUESTIONS 1-9: 0
1. LITTLE INTEREST OR PLEASURE IN DOING THINGS: NOT AT ALL
SUM OF ALL RESPONSES TO PHQ9 QUESTIONS 1 & 2: 0
SUM OF ALL RESPONSES TO PHQ QUESTIONS 1-9: 0
SUM OF ALL RESPONSES TO PHQ QUESTIONS 1-9: 0
2. FEELING DOWN, DEPRESSED OR HOPELESS: NOT AT ALL

## 2024-05-09 NOTE — PROGRESS NOTES
Marcie Rojas (:  1965) is a 59 y.o. female,Established patient, here for evaluation of the following chief complaint(S):  Follow-up Chronic Condition ((Rm 11) 6 mo fu/labs/med refills )      Assessment & Plan   1. Essential (primary) hypertension  -     spironolactone (ALDACTONE) 25 MG tablet; Take 1 tablet by mouth daily, Disp-90 tablet, R-1Normal  -     losartan (COZAAR) 50 MG tablet; Take 1 tablet by mouth daily, Disp-90 tablet, R-1Normal  -     amLODIPine (NORVASC) 5 MG tablet; Take 1 tablet by mouth daily, Disp-90 tablet, R-1Normal  -     Comprehensive Metabolic Panel; Future  2. Gastro-esophageal reflux disease with esophagitis, without bleeding  -     omeprazole (PRILOSEC) 40 MG delayed release capsule; Take 1 capsule by mouth daily, Disp-90 capsule, R-1Normal  3. Other hyperlipidemia  -     pravastatin (PRAVACHOL) 40 MG tablet; Take 1 tablet by mouth at bedtime, Disp-90 tablet, R-1Normal  -     Lipid Panel; Future  4. Other bursitis of knee, left knee  -     diclofenac sodium (VOLTAREN) 1 % GEL; Apply 4 g topically 4 times daily, Topical, 4 TIMES DAILY Starting Thu 2024, Disp-350 g, R-5, Normal  HTN well controlled on current med's checking labs today  Reflux controlled on current med's  Hyperlipidemia refilled Pravachol and will adjust if needed per labs  Knee pain continue Voltaren.  Praised reduction in smoking and urged to continue efforts    Return in about 6 months (around 2024) for fasting labs/med refills.       Subjective   HPI Here for refills of med's  Not taking smoking cessation drugs but down to 4 Paks per week  Cholesterol taking med's Is trying to exercise and eating healthy  HTN no chest pain no shortness of breath  BP controlled  Joint pain Voltaren still working for this   Reflux controlled with current med  Review of Systems   Respiratory:  Negative for chest tightness and shortness of breath.    Cardiovascular:  Negative for chest pain and leg swelling.

## 2024-05-10 ENCOUNTER — TELEPHONE (OUTPATIENT)
Dept: FAMILY MEDICINE CLINIC | Facility: CLINIC | Age: 59
End: 2024-05-10

## 2024-05-10 NOTE — TELEPHONE ENCOUNTER
----- Message from AWA Dacosta NP sent at 5/9/2024  4:31 PM EDT -----  Blood sugar is jacked up avoid simple carbs and sugars.

## 2024-06-03 ENCOUNTER — TELEPHONE (OUTPATIENT)
Dept: FAMILY MEDICINE CLINIC | Facility: CLINIC | Age: 59
End: 2024-06-03

## 2024-06-03 NOTE — TELEPHONE ENCOUNTER
I sent a [employer] message about her mammogram results along with the phone number to call to schedule her appointment for additional views.

## 2024-06-03 NOTE — TELEPHONE ENCOUNTER
----- Message from AWA Dacosta NP sent at 6/3/2024  7:43 AM EDT -----  Make sure she knows to go for additional views thanks  ----- Message -----  From: Preeti Burns  Sent: 5/29/2024   9:41 AM EDT  To: AWA Dacosta NP

## 2024-11-06 ENCOUNTER — OFFICE VISIT (OUTPATIENT)
Dept: FAMILY MEDICINE CLINIC | Facility: CLINIC | Age: 59
End: 2024-11-06

## 2024-11-06 VITALS
WEIGHT: 215 LBS | HEIGHT: 64 IN | TEMPERATURE: 98.7 F | SYSTOLIC BLOOD PRESSURE: 122 MMHG | DIASTOLIC BLOOD PRESSURE: 78 MMHG | BODY MASS INDEX: 36.7 KG/M2 | HEART RATE: 93 BPM

## 2024-11-06 DIAGNOSIS — M70.52 OTHER BURSITIS OF KNEE, LEFT KNEE: ICD-10-CM

## 2024-11-06 DIAGNOSIS — E78.49 OTHER HYPERLIPIDEMIA: ICD-10-CM

## 2024-11-06 DIAGNOSIS — Z23 NEED FOR INFLUENZA VACCINATION: Primary | ICD-10-CM

## 2024-11-06 DIAGNOSIS — I10 ESSENTIAL (PRIMARY) HYPERTENSION: ICD-10-CM

## 2024-11-06 DIAGNOSIS — K21.00 GASTRO-ESOPHAGEAL REFLUX DISEASE WITH ESOPHAGITIS, WITHOUT BLEEDING: ICD-10-CM

## 2024-11-06 DIAGNOSIS — R73.9 ELEVATED BLOOD SUGAR: ICD-10-CM

## 2024-11-06 DIAGNOSIS — F41.9 ANXIETY: ICD-10-CM

## 2024-11-06 LAB
ALBUMIN SERPL-MCNC: 4 G/DL (ref 3.2–4.6)
ALBUMIN/GLOB SERPL: 1.1 (ref 1–1.9)
ALP SERPL-CCNC: 78 U/L (ref 35–104)
ALT SERPL-CCNC: 40 U/L (ref 8–45)
ANION GAP SERPL CALC-SCNC: 11 MMOL/L (ref 7–16)
AST SERPL-CCNC: 31 U/L (ref 15–37)
BILIRUB SERPL-MCNC: 0.7 MG/DL (ref 0–1.2)
BUN SERPL-MCNC: 9 MG/DL (ref 8–23)
CALCIUM SERPL-MCNC: 9.6 MG/DL (ref 8.8–10.2)
CHLORIDE SERPL-SCNC: 102 MMOL/L (ref 98–107)
CHOLEST SERPL-MCNC: 172 MG/DL (ref 0–200)
CO2 SERPL-SCNC: 26 MMOL/L (ref 20–29)
CREAT SERPL-MCNC: 0.72 MG/DL (ref 0.6–1.1)
GLOBULIN SER CALC-MCNC: 3.5 G/DL (ref 2.3–3.5)
GLUCOSE SERPL-MCNC: 152 MG/DL (ref 70–99)
HDLC SERPL-MCNC: 48 MG/DL (ref 40–60)
HDLC SERPL: 3.6 (ref 0–5)
LDLC SERPL CALC-MCNC: 98 MG/DL (ref 0–100)
POTASSIUM SERPL-SCNC: 4.5 MMOL/L (ref 3.5–5.1)
PROT SERPL-MCNC: 7.4 G/DL (ref 6.3–8.2)
SODIUM SERPL-SCNC: 139 MMOL/L (ref 136–145)
TRIGL SERPL-MCNC: 129 MG/DL (ref 0–150)
VLDLC SERPL CALC-MCNC: 26 MG/DL (ref 6–23)

## 2024-11-06 RX ORDER — LOSARTAN POTASSIUM 50 MG/1
50 TABLET ORAL DAILY
Qty: 90 TABLET | Refills: 1 | Status: SHIPPED | OUTPATIENT
Start: 2024-11-06

## 2024-11-06 RX ORDER — SERTRALINE HYDROCHLORIDE 25 MG/1
TABLET, FILM COATED ORAL
Qty: 60 TABLET | Refills: 1 | Status: SHIPPED | OUTPATIENT
Start: 2024-11-06

## 2024-11-06 RX ORDER — OMEPRAZOLE 40 MG/1
40 CAPSULE, DELAYED RELEASE ORAL DAILY
Qty: 90 CAPSULE | Refills: 1 | Status: SHIPPED | OUTPATIENT
Start: 2024-11-06

## 2024-11-06 RX ORDER — PRAVASTATIN SODIUM 40 MG
40 TABLET ORAL NIGHTLY
Qty: 90 TABLET | Refills: 1 | Status: SHIPPED | OUTPATIENT
Start: 2024-11-06

## 2024-11-06 RX ORDER — SPIRONOLACTONE 25 MG/1
25 TABLET ORAL DAILY
Qty: 90 TABLET | Refills: 1 | Status: SHIPPED | OUTPATIENT
Start: 2024-11-06

## 2024-11-06 RX ORDER — AMLODIPINE BESYLATE 5 MG/1
5 TABLET ORAL DAILY
Qty: 90 TABLET | Refills: 1 | Status: SHIPPED | OUTPATIENT
Start: 2024-11-06

## 2024-11-06 ASSESSMENT — ENCOUNTER SYMPTOMS
SHORTNESS OF BREATH: 0
CHEST TIGHTNESS: 0

## 2024-11-06 NOTE — PROGRESS NOTES
Marcie Rojas (:  1965) is a 59 y.o. female,Established patient, here for evaluation of the following chief complaint(s):  Follow-up Chronic Condition (F4 6 mo fu/labs/med refills )         Assessment & Plan  Essential (primary) hypertension   Chronic, at goal (stable), continue current treatment plan    Orders:    amLODIPine (NORVASC) 5 MG tablet; Take 1 tablet by mouth daily    losartan (COZAAR) 50 MG tablet; Take 1 tablet by mouth daily    spironolactone (ALDACTONE) 25 MG tablet; Take 1 tablet by mouth daily    Gastro-esophageal reflux disease with esophagitis, without bleeding   Chronic, at goal (stable), continue current treatment plan    Orders:    omeprazole (PRILOSEC) 40 MG delayed release capsule; Take 1 capsule by mouth daily    Other bursitis of knee, left knee   Chronic, at goal (stable), continue current treatment plan    Orders:    diclofenac sodium (VOLTAREN) 1 % GEL; Apply 4 g topically 4 times daily    Other hyperlipidemia    Checking labs and will  adjust meds if needed    Orders:    pravastatin (PRAVACHOL) 40 MG tablet; Take 1 tablet by mouth at bedtime    Comprehensive Metabolic Panel; Future    Lipid Panel; Future    Lipid Panel    Comprehensive Metabolic Panel    Need for influenza vaccination    Updated flu shot    Orders:    Influenza, FLUCELVAX Trivalent, (age 6 mo+) IM, Preservative Free, 0.5mL    Anxiety    Recurrent issue will start Zoloft  demonstrated boxed breathing technique and tapping for anxiety. Is to follow up in 1 month for recheck    Orders:    sertraline (ZOLOFT) 25 MG tablet; Take one daily for 2 weeks then increase to 50 mg or two daily      Return in about 1 month (around 2024) for Med Recheck, 6 mo fu/fasting labs/med refills around 2025.       Subjective   HPI She is here for refills of meds for  HTN taking med  Cholesterol trying to eat healthy and is exercising some  Reflux controlled with current med  IS using her CPAP   Living in a hotel as

## 2024-11-06 NOTE — TELEPHONE ENCOUNTER
Marcie Rickettsl [facility] Clinical Staff (supporting You)1 hour ago (4:04 PM)     RG  have appointment schedule for June 5th ...do not understand what they saw..can u put it in words that I will understand        Patient came in office for INR and stated she received a message to have a urine test done to evaluate her kidney function. Can you please please orders so she can have this completed?

## 2024-11-07 ENCOUNTER — TELEPHONE (OUTPATIENT)
Dept: FAMILY MEDICINE CLINIC | Facility: CLINIC | Age: 59
End: 2024-11-07

## 2024-11-07 NOTE — TELEPHONE ENCOUNTER
----- Message from AWA Gaming NP sent at 11/7/2024  7:43 AM EST -----  BS up again I think I need for you to come in for a ha1c give her draw site info osito to be sure not DM

## 2024-11-07 NOTE — TELEPHONE ENCOUNTER
I sent a [employer] message about her lab results. I also attached a list of the draw sites she can go to if she doesn't want to come back to the office.

## 2024-12-12 ENCOUNTER — TELEMEDICINE (OUTPATIENT)
Dept: FAMILY MEDICINE CLINIC | Facility: CLINIC | Age: 59
End: 2024-12-12
Payer: COMMERCIAL

## 2024-12-12 DIAGNOSIS — J40 BRONCHITIS: Primary | ICD-10-CM

## 2024-12-12 PROCEDURE — 99213 OFFICE O/P EST LOW 20 MIN: CPT | Performed by: NURSE PRACTITIONER

## 2024-12-12 RX ORDER — DOXYCYCLINE HYCLATE 100 MG
100 TABLET ORAL 2 TIMES DAILY
Qty: 14 TABLET | Refills: 0 | Status: SHIPPED | OUTPATIENT
Start: 2024-12-12 | End: 2024-12-19

## 2025-01-08 DIAGNOSIS — F41.9 ANXIETY: ICD-10-CM

## 2025-01-09 RX ORDER — SERTRALINE HYDROCHLORIDE 25 MG/1
TABLET, FILM COATED ORAL
Qty: 60 TABLET | Refills: 1 | OUTPATIENT
Start: 2025-01-09

## 2025-01-10 ENCOUNTER — TELEMEDICINE (OUTPATIENT)
Dept: FAMILY MEDICINE CLINIC | Facility: CLINIC | Age: 60
End: 2025-01-10
Payer: COMMERCIAL

## 2025-01-10 DIAGNOSIS — F41.9 ANXIETY: ICD-10-CM

## 2025-01-10 PROCEDURE — 99213 OFFICE O/P EST LOW 20 MIN: CPT | Performed by: NURSE PRACTITIONER

## 2025-01-10 RX ORDER — SERTRALINE HYDROCHLORIDE 25 MG/1
TABLET, FILM COATED ORAL
Qty: 180 TABLET | Refills: 0 | Status: SHIPPED | OUTPATIENT
Start: 2025-01-10

## 2025-01-10 SDOH — ECONOMIC STABILITY: INCOME INSECURITY: IN THE LAST 12 MONTHS, WAS THERE A TIME WHEN YOU WERE NOT ABLE TO PAY THE MORTGAGE OR RENT ON TIME?: NO

## 2025-01-10 SDOH — ECONOMIC STABILITY: FOOD INSECURITY: WITHIN THE PAST 12 MONTHS, THE FOOD YOU BOUGHT JUST DIDN'T LAST AND YOU DIDN'T HAVE MONEY TO GET MORE.: NEVER TRUE

## 2025-01-10 SDOH — ECONOMIC STABILITY: FOOD INSECURITY: WITHIN THE PAST 12 MONTHS, YOU WORRIED THAT YOUR FOOD WOULD RUN OUT BEFORE YOU GOT MONEY TO BUY MORE.: NEVER TRUE

## 2025-01-10 SDOH — ECONOMIC STABILITY: TRANSPORTATION INSECURITY
IN THE PAST 12 MONTHS, HAS THE LACK OF TRANSPORTATION KEPT YOU FROM MEDICAL APPOINTMENTS OR FROM GETTING MEDICATIONS?: NO

## 2025-01-10 SDOH — ECONOMIC STABILITY: TRANSPORTATION INSECURITY
IN THE PAST 12 MONTHS, HAS LACK OF TRANSPORTATION KEPT YOU FROM MEETINGS, WORK, OR FROM GETTING THINGS NEEDED FOR DAILY LIVING?: NO

## 2025-01-10 ASSESSMENT — PATIENT HEALTH QUESTIONNAIRE - PHQ9
SUM OF ALL RESPONSES TO PHQ QUESTIONS 1-9: 0
2. FEELING DOWN, DEPRESSED OR HOPELESS: NOT AT ALL
SUM OF ALL RESPONSES TO PHQ QUESTIONS 1-9: 0
1. LITTLE INTEREST OR PLEASURE IN DOING THINGS: NOT AT ALL
SUM OF ALL RESPONSES TO PHQ QUESTIONS 1-9: 0
SUM OF ALL RESPONSES TO PHQ9 QUESTIONS 1 & 2: 0
SUM OF ALL RESPONSES TO PHQ QUESTIONS 1-9: 0
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
## 2025-04-10 ENCOUNTER — LAB (OUTPATIENT)
Dept: FAMILY MEDICINE CLINIC | Facility: CLINIC | Age: 60
End: 2025-04-10

## 2025-04-10 ENCOUNTER — OFFICE VISIT (OUTPATIENT)
Dept: FAMILY MEDICINE CLINIC | Facility: CLINIC | Age: 60
End: 2025-04-10
Payer: COMMERCIAL

## 2025-04-10 VITALS
HEIGHT: 64 IN | BODY MASS INDEX: 37.22 KG/M2 | SYSTOLIC BLOOD PRESSURE: 117 MMHG | WEIGHT: 218 LBS | TEMPERATURE: 97.8 F | DIASTOLIC BLOOD PRESSURE: 77 MMHG | HEART RATE: 89 BPM

## 2025-04-10 DIAGNOSIS — M70.52 OTHER BURSITIS OF KNEE, LEFT KNEE: ICD-10-CM

## 2025-04-10 DIAGNOSIS — R73.9 ELEVATED BLOOD SUGAR: ICD-10-CM

## 2025-04-10 DIAGNOSIS — I10 ESSENTIAL (PRIMARY) HYPERTENSION: ICD-10-CM

## 2025-04-10 DIAGNOSIS — E78.49 OTHER HYPERLIPIDEMIA: ICD-10-CM

## 2025-04-10 DIAGNOSIS — I10 ESSENTIAL (PRIMARY) HYPERTENSION: Primary | ICD-10-CM

## 2025-04-10 DIAGNOSIS — K21.00 GASTRO-ESOPHAGEAL REFLUX DISEASE WITH ESOPHAGITIS, WITHOUT BLEEDING: ICD-10-CM

## 2025-04-10 DIAGNOSIS — F41.9 ANXIETY: ICD-10-CM

## 2025-04-10 LAB
ALBUMIN SERPL-MCNC: 3.9 G/DL (ref 3.2–4.6)
ALBUMIN/GLOB SERPL: 1.2 (ref 1–1.9)
ALP SERPL-CCNC: 66 U/L (ref 35–104)
ALT SERPL-CCNC: 31 U/L (ref 8–45)
ANION GAP SERPL CALC-SCNC: 10 MMOL/L (ref 7–16)
AST SERPL-CCNC: 28 U/L (ref 15–37)
BILIRUB SERPL-MCNC: 0.7 MG/DL (ref 0–1.2)
BUN SERPL-MCNC: 13 MG/DL (ref 8–23)
CALCIUM SERPL-MCNC: 9.5 MG/DL (ref 8.8–10.2)
CHLORIDE SERPL-SCNC: 101 MMOL/L (ref 98–107)
CHOLEST SERPL-MCNC: 174 MG/DL (ref 0–200)
CO2 SERPL-SCNC: 26 MMOL/L (ref 20–29)
CREAT SERPL-MCNC: 0.73 MG/DL (ref 0.6–1.1)
EST. AVERAGE GLUCOSE BLD GHB EST-MCNC: 164 MG/DL
GLOBULIN SER CALC-MCNC: 3.3 G/DL (ref 2.3–3.5)
GLUCOSE SERPL-MCNC: 194 MG/DL (ref 70–99)
HBA1C MFR BLD: 7.3 % (ref 0–5.6)
HDLC SERPL-MCNC: 43 MG/DL (ref 40–60)
HDLC SERPL: 4.1 (ref 0–5)
LDLC SERPL CALC-MCNC: 100 MG/DL (ref 0–100)
POTASSIUM SERPL-SCNC: 4.5 MMOL/L (ref 3.5–5.1)
PROT SERPL-MCNC: 7.2 G/DL (ref 6.3–8.2)
SODIUM SERPL-SCNC: 137 MMOL/L (ref 136–145)
TRIGL SERPL-MCNC: 158 MG/DL (ref 0–150)
VLDLC SERPL CALC-MCNC: 32 MG/DL (ref 6–23)

## 2025-04-10 PROCEDURE — 3078F DIAST BP <80 MM HG: CPT | Performed by: NURSE PRACTITIONER

## 2025-04-10 PROCEDURE — 99214 OFFICE O/P EST MOD 30 MIN: CPT | Performed by: NURSE PRACTITIONER

## 2025-04-10 PROCEDURE — 3074F SYST BP LT 130 MM HG: CPT | Performed by: NURSE PRACTITIONER

## 2025-04-10 RX ORDER — SPIRONOLACTONE 25 MG/1
25 TABLET ORAL DAILY
Qty: 90 TABLET | Refills: 1 | Status: SHIPPED | OUTPATIENT
Start: 2025-04-10

## 2025-04-10 RX ORDER — OMEPRAZOLE 40 MG/1
40 CAPSULE, DELAYED RELEASE ORAL DAILY
Qty: 90 CAPSULE | Refills: 1 | Status: SHIPPED | OUTPATIENT
Start: 2025-04-10

## 2025-04-10 RX ORDER — AMLODIPINE BESYLATE 5 MG/1
5 TABLET ORAL DAILY
Qty: 90 TABLET | Refills: 1 | Status: SHIPPED | OUTPATIENT
Start: 2025-04-10

## 2025-04-10 RX ORDER — LOSARTAN POTASSIUM 50 MG/1
50 TABLET ORAL DAILY
Qty: 90 TABLET | Refills: 1 | Status: SHIPPED | OUTPATIENT
Start: 2025-04-10

## 2025-04-10 RX ORDER — SERTRALINE HYDROCHLORIDE 25 MG/1
TABLET, FILM COATED ORAL
Qty: 180 TABLET | Refills: 1 | Status: SHIPPED | OUTPATIENT
Start: 2025-04-10

## 2025-04-10 RX ORDER — PRAVASTATIN SODIUM 40 MG
40 TABLET ORAL NIGHTLY
Qty: 90 TABLET | Refills: 1 | Status: SHIPPED | OUTPATIENT
Start: 2025-04-10

## 2025-04-10 NOTE — PROGRESS NOTES
Marcie Rojas (:  1965) is a 60 y.o. female,Established patient, here for evaluation of the following chief complaint(s):  Follow-up Chronic Condition (F4 6 mo fu/labs/med refills )         Assessment & Plan  Anxiety   Chronic, at goal (stable), continue current treatment plan    Orders:    sertraline (ZOLOFT) 25 MG tablet; Take 1-2 tablets by mouth daily    Essential (primary) hypertension   Chronic, at goal (stable), continue current treatment plan    Orders:    amLODIPine (NORVASC) 5 MG tablet; Take 1 tablet by mouth daily    losartan (COZAAR) 50 MG tablet; Take 1 tablet by mouth daily    spironolactone (ALDACTONE) 25 MG tablet; Take 1 tablet by mouth daily    Comprehensive Metabolic Panel; Future    Gastro-esophageal reflux disease with esophagitis, without bleeding   Chronic, at goal (stable), continue current treatment plan    Orders:    omeprazole (PRILOSEC) 40 MG delayed release capsule; Take 1 capsule by mouth daily    Other bursitis of knee, left knee   Chronic, at goal (stable), continue current treatment plan    Orders:    diclofenac sodium (VOLTAREN) 1 % GEL; Apply 4 g topically 4 times daily    Other hyperlipidemia   Chronic, at goal (stable), continue current treatment plan    Orders:    pravastatin (PRAVACHOL) 40 MG tablet; Take 1 tablet by mouth at bedtime    Lipid Panel; Future      Return in about 6 months (around 10/10/2025) for fasting labs/med refills.       Subjective   HPI  She  is here for refills of meds  Anxiety She is doing well Still working full time  Hyperlipidemia taking med  daily and is not exercising much is eating well  Bursitis knee is ok uses med when needed after yard work  Reflux controlled with current med  HTN well controlled no chest pain no shortness of breath  Review of Systems   Knee pain only at times  No chest pain no shortness of breath      Objective   Physical Exam  Vitals and nursing note reviewed.   Constitutional:       Appearance: Normal

## 2025-04-11 ENCOUNTER — RESULTS FOLLOW-UP (OUTPATIENT)
Dept: FAMILY MEDICINE CLINIC | Facility: CLINIC | Age: 60
End: 2025-04-11

## 2025-04-11 DIAGNOSIS — E11.9 TYPE 2 DIABETES MELLITUS WITHOUT COMPLICATION, WITHOUT LONG-TERM CURRENT USE OF INSULIN: Primary | ICD-10-CM

## 2025-04-11 RX ORDER — METFORMIN HYDROCHLORIDE 500 MG/1
TABLET, EXTENDED RELEASE ORAL
Qty: 180 TABLET | Refills: 1 | Status: SHIPPED | OUTPATIENT
Start: 2025-04-11

## 2025-04-11 NOTE — TELEPHONE ENCOUNTER
I sent a [facility] message about her lab results. I also attached an appointment link for her to schedule her 3 mo fu for diabetes/labs with Analy around 7/11 or the following week. She still have to keep her 6 mo fu appointment for her med refills.

## 2025-04-11 NOTE — TELEPHONE ENCOUNTER
----- Message from AWA Gaming NP sent at 4/11/2025  7:51 AM EDT -----  Ha1c is not controlled is 7.3 so we need to do something will put her on metformin 500 bid and have her follow up in 3 months not six

## 2025-07-11 ENCOUNTER — LAB (OUTPATIENT)
Dept: FAMILY MEDICINE CLINIC | Facility: CLINIC | Age: 60
End: 2025-07-11

## 2025-07-11 ENCOUNTER — OFFICE VISIT (OUTPATIENT)
Dept: FAMILY MEDICINE CLINIC | Facility: CLINIC | Age: 60
End: 2025-07-11
Payer: COMMERCIAL

## 2025-07-11 VITALS
HEIGHT: 64 IN | WEIGHT: 216 LBS | HEART RATE: 90 BPM | SYSTOLIC BLOOD PRESSURE: 137 MMHG | DIASTOLIC BLOOD PRESSURE: 82 MMHG | TEMPERATURE: 98 F | BODY MASS INDEX: 36.88 KG/M2

## 2025-07-11 DIAGNOSIS — E11.9 TYPE 2 DIABETES MELLITUS WITHOUT COMPLICATION, WITHOUT LONG-TERM CURRENT USE OF INSULIN (HCC): ICD-10-CM

## 2025-07-11 DIAGNOSIS — M79.671 RIGHT FOOT PAIN: Primary | ICD-10-CM

## 2025-07-11 LAB
EST. AVERAGE GLUCOSE BLD GHB EST-MCNC: 151 MG/DL
HBA1C MFR BLD: 6.9 % (ref 0–5.6)

## 2025-07-11 PROCEDURE — 3075F SYST BP GE 130 - 139MM HG: CPT | Performed by: NURSE PRACTITIONER

## 2025-07-11 PROCEDURE — 3051F HG A1C>EQUAL 7.0%<8.0%: CPT | Performed by: NURSE PRACTITIONER

## 2025-07-11 PROCEDURE — 3079F DIAST BP 80-89 MM HG: CPT | Performed by: NURSE PRACTITIONER

## 2025-07-11 PROCEDURE — 99214 OFFICE O/P EST MOD 30 MIN: CPT | Performed by: NURSE PRACTITIONER

## 2025-07-11 NOTE — PROGRESS NOTES
Marcie Rojas (:  1965) is a 60 y.o. female,Established patient, here for evaluation of the following chief complaint(s):  Diabetes (F4 3 mo fu/lab-never started the Metformin -never picked up -she canceled the RX ) and Foot Pain (Pt said she ran into the laundry basket on Monday and hit her pinky toe on her right foot )         Assessment & Plan  Right foot pain   New, uncertain prognosis, will get xray to rule out fracture. Is to ice elevate and continue to sun tape toes for stability. Will sen dto ortho if fracture is present    Orders:    XR FOOT RIGHT (2 VIEWS); Future    Type 2 diabetes mellitus without complication, without long-term current use of insulin (HCC)   Chronic, not at goal (unstable), will see what Ha1c is if over 7 again discussed options for meds.     Orders:    Hemoglobin A1C; Future      Return for As scheduled in October.       Subjective   HPI Here for follow up for DM   Has not started the metformen. Did dietary changes instead.   New issue hurt right foot on laundry basket  on Monday.     Review of Systems   Right foot pain    Objective   Physical Exam  Vitals and nursing note reviewed.   Constitutional:       Appearance: Normal appearance. She is obese.   Cardiovascular:      Rate and Rhythm: Normal rate and regular rhythm.      Pulses: Normal pulses.      Heart sounds: Normal heart sounds.   Musculoskeletal:         General: Tenderness, deformity and signs of injury present.      Comments: Limping gai. Bruising in right later foot and pt tenderness at base of 4th and 5th toes. Unable to curl right little toe.    Skin:     General: Skin is warm and dry.   Neurological:      Mental Status: She is alert.   Psychiatric:         Mood and Affect: Mood normal.         Behavior: Behavior normal.         Thought Content: Thought content normal.         Judgment: Judgment normal.          /82 (BP Site: Left Upper Arm, Patient Position: Sitting, BP Cuff Size: Large Adult)

## 2025-07-14 ENCOUNTER — RESULTS FOLLOW-UP (OUTPATIENT)
Dept: FAMILY MEDICINE CLINIC | Facility: CLINIC | Age: 60
End: 2025-07-14

## 2025-07-14 NOTE — TELEPHONE ENCOUNTER
----- Message from AWA Gaming NP sent at 7/14/2025  6:33 AM EDT -----  Ha1c awesome at 6.9 you cn go without med as doing well with diet changes

## 2025-07-14 NOTE — TELEPHONE ENCOUNTER
----- Message from AWA Gaming NP sent at 7/14/2025  6:33 AM EDT -----  Finally got your xray no fracture  You do have a bone spur but that is obviously not your source of pain at present